# Patient Record
Sex: FEMALE | Race: BLACK OR AFRICAN AMERICAN | Employment: OTHER | URBAN - METROPOLITAN AREA
[De-identification: names, ages, dates, MRNs, and addresses within clinical notes are randomized per-mention and may not be internally consistent; named-entity substitution may affect disease eponyms.]

---

## 2019-11-02 ENCOUNTER — HOSPITAL ENCOUNTER (EMERGENCY)
Age: 44
Discharge: HOME | End: 2019-11-02
Attending: FAMILY MEDICINE
Payer: COMMERCIAL

## 2019-11-02 VITALS
HEART RATE: 83 BPM | DIASTOLIC BLOOD PRESSURE: 97 MMHG | WEIGHT: 142 LBS | HEIGHT: 65 IN | SYSTOLIC BLOOD PRESSURE: 154 MMHG | BODY MASS INDEX: 23.66 KG/M2 | RESPIRATION RATE: 18 BRPM | TEMPERATURE: 98.5 F | OXYGEN SATURATION: 100 %

## 2019-11-02 DIAGNOSIS — N39.0 ACUTE UTI: ICD-10-CM

## 2019-11-02 DIAGNOSIS — E86.0 DEHYDRATION: Primary | ICD-10-CM

## 2019-11-02 LAB
ALBUMIN SERPL-MCNC: 4.6 G/DL (ref 3.5–4.6)
ALP BLD-CCNC: 40 U/L (ref 40–130)
ALT SERPL-CCNC: 9 U/L (ref 0–33)
AMPHETAMINE SCREEN, URINE: ABNORMAL
ANION GAP SERPL CALCULATED.3IONS-SCNC: 17 MEQ/L (ref 9–15)
AST SERPL-CCNC: 18 U/L (ref 0–35)
BACTERIA: NEGATIVE /HPF
BARBITURATE SCREEN URINE: ABNORMAL
BASOPHILS ABSOLUTE: 0.1 K/UL (ref 0–0.2)
BASOPHILS RELATIVE PERCENT: 2.3 %
BENZODIAZEPINE SCREEN, URINE: ABNORMAL
BILIRUB SERPL-MCNC: 1.4 MG/DL (ref 0.2–0.7)
BILIRUBIN URINE: NEGATIVE
BLOOD, URINE: ABNORMAL
BUN BLDV-MCNC: 6 MG/DL (ref 6–20)
CALCIUM SERPL-MCNC: 8.9 MG/DL (ref 8.5–9.9)
CANNABINOID SCREEN URINE: POSITIVE
CHLORIDE BLD-SCNC: 100 MEQ/L (ref 95–107)
CLARITY: ABNORMAL
CO2: 25 MEQ/L (ref 20–31)
COCAINE METABOLITE SCREEN URINE: ABNORMAL
COLOR: ABNORMAL
CREAT SERPL-MCNC: 0.61 MG/DL (ref 0.5–0.9)
EOSINOPHILS ABSOLUTE: 0.1 K/UL (ref 0–0.7)
EOSINOPHILS RELATIVE PERCENT: 1.7 %
EPITHELIAL CELLS, UA: ABNORMAL /HPF (ref 0–5)
GFR AFRICAN AMERICAN: >60
GFR NON-AFRICAN AMERICAN: >60
GLOBULIN: 2.4 G/DL (ref 2.3–3.5)
GLUCOSE BLD-MCNC: 78 MG/DL (ref 70–99)
GLUCOSE URINE: NEGATIVE MG/DL
HBA1C MFR BLD: 4.8 % (ref 4.8–5.9)
HCT VFR BLD CALC: 41.7 % (ref 37–47)
HEMOGLOBIN: 14.1 G/DL (ref 12–16)
HYALINE CASTS: ABNORMAL /HPF (ref 0–5)
KETONES, URINE: ABNORMAL MG/DL
LEUKOCYTE ESTERASE, URINE: ABNORMAL
LYMPHOCYTES ABSOLUTE: 1.8 K/UL (ref 1–4.8)
LYMPHOCYTES RELATIVE PERCENT: 40.7 %
Lab: ABNORMAL
MCH RBC QN AUTO: 33.2 PG (ref 27–31.3)
MCHC RBC AUTO-ENTMCNC: 33.8 % (ref 33–37)
MCV RBC AUTO: 98.1 FL (ref 82–100)
METHADONE SCREEN, URINE: ABNORMAL
MONOCYTES ABSOLUTE: 0.3 K/UL (ref 0.2–0.8)
MONOCYTES RELATIVE PERCENT: 6.6 %
NEUTROPHILS ABSOLUTE: 2.2 K/UL (ref 1.4–6.5)
NEUTROPHILS RELATIVE PERCENT: 48.7 %
NITRITE, URINE: NEGATIVE
OPIATE SCREEN URINE: ABNORMAL
OXYCODONE URINE: ABNORMAL
PDW BLD-RTO: 14.3 % (ref 11.5–14.5)
PH UA: 6 (ref 5–9)
PHENCYCLIDINE SCREEN URINE: ABNORMAL
PLATELET # BLD: 102 K/UL (ref 130–400)
POTASSIUM SERPL-SCNC: 3.9 MEQ/L (ref 3.4–4.9)
PROPOXYPHENE SCREEN: ABNORMAL
PROTEIN UA: ABNORMAL MG/DL
RBC # BLD: 4.25 M/UL (ref 4.2–5.4)
RBC UA: ABNORMAL /HPF (ref 0–5)
SODIUM BLD-SCNC: 142 MEQ/L (ref 135–144)
SPECIFIC GRAVITY UA: 1.02 (ref 1–1.03)
TOTAL PROTEIN: 7 G/DL (ref 6.3–8)
URINE REFLEX TO CULTURE: YES
UROBILINOGEN, URINE: 1 E.U./DL
WBC # BLD: 4.5 K/UL (ref 4.8–10.8)
WBC UA: ABNORMAL /HPF (ref 0–5)

## 2019-11-02 PROCEDURE — 87086 URINE CULTURE/COLONY COUNT: CPT

## 2019-11-02 PROCEDURE — 85025 COMPLETE CBC W/AUTO DIFF WBC: CPT

## 2019-11-02 PROCEDURE — 81001 URINALYSIS AUTO W/SCOPE: CPT

## 2019-11-02 PROCEDURE — 2580000003 HC RX 258: Performed by: FAMILY MEDICINE

## 2019-11-02 PROCEDURE — 96360 HYDRATION IV INFUSION INIT: CPT

## 2019-11-02 PROCEDURE — 87186 SC STD MICRODIL/AGAR DIL: CPT

## 2019-11-02 PROCEDURE — 99283 EMERGENCY DEPT VISIT LOW MDM: CPT

## 2019-11-02 PROCEDURE — 87077 CULTURE AEROBIC IDENTIFY: CPT

## 2019-11-02 PROCEDURE — 80307 DRUG TEST PRSMV CHEM ANLYZR: CPT

## 2019-11-02 PROCEDURE — 96361 HYDRATE IV INFUSION ADD-ON: CPT

## 2019-11-02 PROCEDURE — 80053 COMPREHEN METABOLIC PANEL: CPT

## 2019-11-02 PROCEDURE — 83036 HEMOGLOBIN GLYCOSYLATED A1C: CPT

## 2019-11-02 PROCEDURE — 36415 COLL VENOUS BLD VENIPUNCTURE: CPT

## 2019-11-02 RX ORDER — CIPROFLOXACIN 500 MG/1
500 TABLET, FILM COATED ORAL ONCE
Status: DISCONTINUED | OUTPATIENT
Start: 2019-11-02 | End: 2019-11-02

## 2019-11-02 RX ORDER — CIPROFLOXACIN 500 MG/1
500 TABLET, FILM COATED ORAL 2 TIMES DAILY
Qty: 14 TABLET | Refills: 0 | Status: SHIPPED | OUTPATIENT
Start: 2019-11-02 | End: 2019-11-09

## 2019-11-02 RX ORDER — 0.9 % SODIUM CHLORIDE 0.9 %
1000 INTRAVENOUS SOLUTION INTRAVENOUS ONCE
Status: COMPLETED | OUTPATIENT
Start: 2019-11-02 | End: 2019-11-02

## 2019-11-02 RX ADMIN — SODIUM CHLORIDE 1000 ML: 9 INJECTION, SOLUTION INTRAVENOUS at 17:18

## 2019-11-02 ASSESSMENT — PAIN SCALES - GENERAL: PAINLEVEL_OUTOF10: 9

## 2019-11-03 ASSESSMENT — ENCOUNTER SYMPTOMS
RESPIRATORY NEGATIVE: 1
EYES NEGATIVE: 1
GASTROINTESTINAL NEGATIVE: 1
ALLERGIC/IMMUNOLOGIC NEGATIVE: 1

## 2019-11-04 LAB
ORGANISM: ABNORMAL
URINE CULTURE, ROUTINE: ABNORMAL

## 2020-07-13 ENCOUNTER — HOSPITAL ENCOUNTER (INPATIENT)
Age: 45
LOS: 4 days | Discharge: HOME | DRG: 751 | End: 2020-07-17
Attending: EMERGENCY MEDICINE | Admitting: PSYCHIATRY & NEUROLOGY
Payer: COMMERCIAL

## 2020-07-13 PROBLEM — F33.9 MAJOR DEPRESSION, RECURRENT (HCC): Status: ACTIVE | Noted: 2020-07-13

## 2020-07-13 LAB
ACETAMINOPHEN LEVEL: <5 UG/ML (ref 10–30)
ALBUMIN SERPL-MCNC: 4.5 G/DL (ref 3.5–4.6)
ALP BLD-CCNC: 39 U/L (ref 40–130)
ALT SERPL-CCNC: 24 U/L (ref 0–33)
AMPHETAMINE SCREEN, URINE: ABNORMAL
ANION GAP SERPL CALCULATED.3IONS-SCNC: 17 MEQ/L (ref 9–15)
AST SERPL-CCNC: 42 U/L (ref 0–35)
BACTERIA: NEGATIVE /HPF
BARBITURATE SCREEN URINE: ABNORMAL
BENZODIAZEPINE SCREEN, URINE: ABNORMAL
BILIRUB SERPL-MCNC: 1.1 MG/DL (ref 0.2–0.7)
BILIRUBIN URINE: NEGATIVE
BLOOD, URINE: ABNORMAL
BUN BLDV-MCNC: 5 MG/DL (ref 6–20)
CALCIUM SERPL-MCNC: 9.4 MG/DL (ref 8.5–9.9)
CANNABINOID SCREEN URINE: POSITIVE
CHLORIDE BLD-SCNC: 102 MEQ/L (ref 95–107)
CHOLESTEROL, TOTAL: 169 MG/DL (ref 0–199)
CLARITY: CLEAR
CO2: 19 MEQ/L (ref 20–31)
COCAINE METABOLITE SCREEN URINE: ABNORMAL
COLOR: YELLOW
CREAT SERPL-MCNC: 0.59 MG/DL (ref 0.5–0.9)
EPITHELIAL CELLS, UA: ABNORMAL /HPF (ref 0–5)
ETHANOL PERCENT: 0.03 G/DL
ETHANOL: 30 MG/DL (ref 0–0.08)
GFR AFRICAN AMERICAN: >60
GFR NON-AFRICAN AMERICAN: >60
GLOBULIN: 2.4 G/DL (ref 2.3–3.5)
GLUCOSE BLD-MCNC: 86 MG/DL (ref 70–99)
GLUCOSE URINE: NEGATIVE MG/DL
HCT VFR BLD CALC: 38.7 % (ref 37–47)
HDLC SERPL-MCNC: 87 MG/DL (ref 40–59)
HEMOGLOBIN: 13 G/DL (ref 12–16)
HYALINE CASTS: ABNORMAL /HPF (ref 0–5)
KETONES, URINE: 15 MG/DL
LDL CHOLESTEROL CALCULATED: 50 MG/DL (ref 0–129)
LEUKOCYTE ESTERASE, URINE: ABNORMAL
Lab: ABNORMAL
MCH RBC QN AUTO: 33.5 PG (ref 27–31.3)
MCHC RBC AUTO-ENTMCNC: 33.7 % (ref 33–37)
MCV RBC AUTO: 99.3 FL (ref 82–100)
METHADONE SCREEN, URINE: ABNORMAL
NITRITE, URINE: NEGATIVE
OPIATE SCREEN URINE: ABNORMAL
OXYCODONE URINE: ABNORMAL
PDW BLD-RTO: 14.1 % (ref 11.5–14.5)
PH UA: 5 (ref 5–9)
PHENCYCLIDINE SCREEN URINE: ABNORMAL
PLATELET # BLD: 143 K/UL (ref 130–400)
POTASSIUM SERPL-SCNC: 4 MEQ/L (ref 3.4–4.9)
PROPOXYPHENE SCREEN: ABNORMAL
PROTEIN UA: ABNORMAL MG/DL
RBC # BLD: 3.9 M/UL (ref 4.2–5.4)
RBC UA: ABNORMAL /HPF (ref 0–5)
SALICYLATE, SERUM: <0.3 MG/DL (ref 15–30)
SARS-COV-2, NAAT: NOT DETECTED
SODIUM BLD-SCNC: 138 MEQ/L (ref 135–144)
SPECIFIC GRAVITY UA: 1.02 (ref 1–1.03)
TOTAL CK: 94 U/L (ref 0–170)
TOTAL PROTEIN: 6.9 G/DL (ref 6.3–8)
TRIGL SERPL-MCNC: 158 MG/DL (ref 0–150)
TSH SERPL DL<=0.05 MIU/L-ACNC: 0.3 UIU/ML (ref 0.44–3.86)
UROBILINOGEN, URINE: 1 E.U./DL
WBC # BLD: 5.5 K/UL (ref 4.8–10.8)
WBC UA: ABNORMAL /HPF (ref 0–5)

## 2020-07-13 PROCEDURE — 85027 COMPLETE CBC AUTOMATED: CPT

## 2020-07-13 PROCEDURE — 80061 LIPID PANEL: CPT

## 2020-07-13 PROCEDURE — U0002 COVID-19 LAB TEST NON-CDC: HCPCS

## 2020-07-13 PROCEDURE — 1240000000 HC EMOTIONAL WELLNESS R&B

## 2020-07-13 PROCEDURE — 82550 ASSAY OF CK (CPK): CPT

## 2020-07-13 PROCEDURE — 80053 COMPREHEN METABOLIC PANEL: CPT

## 2020-07-13 PROCEDURE — 6370000000 HC RX 637 (ALT 250 FOR IP): Performed by: PSYCHIATRY & NEUROLOGY

## 2020-07-13 PROCEDURE — G0480 DRUG TEST DEF 1-7 CLASSES: HCPCS

## 2020-07-13 PROCEDURE — 6370000000 HC RX 637 (ALT 250 FOR IP): Performed by: EMERGENCY MEDICINE

## 2020-07-13 PROCEDURE — 99285 EMERGENCY DEPT VISIT HI MDM: CPT

## 2020-07-13 PROCEDURE — 36415 COLL VENOUS BLD VENIPUNCTURE: CPT

## 2020-07-13 PROCEDURE — 80307 DRUG TEST PRSMV CHEM ANLYZR: CPT

## 2020-07-13 PROCEDURE — 81001 URINALYSIS AUTO W/SCOPE: CPT

## 2020-07-13 PROCEDURE — 84443 ASSAY THYROID STIM HORMONE: CPT

## 2020-07-13 RX ORDER — POLYETHYLENE GLYCOL 3350 17 G/17G
17 POWDER, FOR SOLUTION ORAL DAILY PRN
Status: DISCONTINUED | OUTPATIENT
Start: 2020-07-13 | End: 2020-07-17 | Stop reason: HOSPADM

## 2020-07-13 RX ORDER — BENZTROPINE MESYLATE 1 MG/ML
2 INJECTION INTRAMUSCULAR; INTRAVENOUS 2 TIMES DAILY PRN
Status: DISCONTINUED | OUTPATIENT
Start: 2020-07-13 | End: 2020-07-17 | Stop reason: HOSPADM

## 2020-07-13 RX ORDER — ACETAMINOPHEN 325 MG/1
650 TABLET ORAL EVERY 4 HOURS PRN
Status: DISCONTINUED | OUTPATIENT
Start: 2020-07-13 | End: 2020-07-17 | Stop reason: HOSPADM

## 2020-07-13 RX ORDER — ESCITALOPRAM OXALATE 20 MG/1
20 TABLET ORAL DAILY
Status: ON HOLD | COMMUNITY
Start: 2020-05-25 | End: 2020-07-17 | Stop reason: HOSPADM

## 2020-07-13 RX ORDER — HYDROXYZINE PAMOATE 50 MG/1
50 CAPSULE ORAL EVERY 6 HOURS PRN
Status: DISCONTINUED | OUTPATIENT
Start: 2020-07-13 | End: 2020-07-17 | Stop reason: HOSPADM

## 2020-07-13 RX ORDER — TRAZODONE HYDROCHLORIDE 50 MG/1
50 TABLET ORAL NIGHTLY PRN
Status: DISCONTINUED | OUTPATIENT
Start: 2020-07-13 | End: 2020-07-14

## 2020-07-13 RX ORDER — LORAZEPAM 1 MG/1
1 TABLET ORAL ONCE
Status: COMPLETED | OUTPATIENT
Start: 2020-07-13 | End: 2020-07-13

## 2020-07-13 RX ORDER — LISINOPRIL 40 MG/1
40 TABLET ORAL DAILY
COMMUNITY
Start: 2020-05-25 | End: 2020-08-22

## 2020-07-13 RX ORDER — HYDROXYZINE HYDROCHLORIDE 50 MG/ML
50 INJECTION, SOLUTION INTRAMUSCULAR EVERY 6 HOURS PRN
Status: DISCONTINUED | OUTPATIENT
Start: 2020-07-13 | End: 2020-07-17 | Stop reason: HOSPADM

## 2020-07-13 RX ADMIN — LORAZEPAM 1 MG: 1 TABLET ORAL at 13:52

## 2020-07-13 RX ADMIN — TRAZODONE HYDROCHLORIDE 50 MG: 50 TABLET ORAL at 22:56

## 2020-07-13 RX ADMIN — HYDROXYZINE PAMOATE 50 MG: 50 CAPSULE ORAL at 22:56

## 2020-07-13 ASSESSMENT — SLEEP AND FATIGUE QUESTIONNAIRES
DO YOU USE A SLEEP AID: YES
RESTFUL SLEEP: NO
SLEEP PATTERN: DIFFICULTY FALLING ASLEEP;DISTURBED/INTERRUPTED SLEEP;EARLY AWAKENING;NIGHTMARES/TERRORS
DO YOU HAVE DIFFICULTY SLEEPING: YES
DIFFICULTY ARISING: NO
DIFFICULTY STAYING ASLEEP: YES
AVERAGE NUMBER OF SLEEP HOURS: 4
DIFFICULTY FALLING ASLEEP: YES

## 2020-07-13 ASSESSMENT — PATIENT HEALTH QUESTIONNAIRE - PHQ9
SUM OF ALL RESPONSES TO PHQ QUESTIONS 1-9: 23
SUM OF ALL RESPONSES TO PHQ QUESTIONS 1-9: 23
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
## 2020-07-13 NOTE — ED NOTES
Pt to [facility]. Security gathering belongings.      Carolee Lawrence RN  07/13/20 8922

## 2020-07-13 NOTE — ED NOTES
Pt changed in to psych safe clothing pt seems to be calming down and answering questions pt reports  That she has bad thoughts all the time \" sometimes I can fight them off\" pt states that she feels safe with this writer pt in room given lunch tray at this time pt does not want to eat given water and will remain a 1:1 psych RN made a call to family as listed in the note      Edgar Michael RN  07/13/20 6920

## 2020-07-14 PROCEDURE — 6370000000 HC RX 637 (ALT 250 FOR IP): Performed by: PSYCHIATRY & NEUROLOGY

## 2020-07-14 PROCEDURE — 99223 1ST HOSP IP/OBS HIGH 75: CPT | Performed by: PSYCHIATRY & NEUROLOGY

## 2020-07-14 PROCEDURE — 1240000000 HC EMOTIONAL WELLNESS R&B

## 2020-07-14 RX ORDER — THIAMINE MONONITRATE (VIT B1) 100 MG
100 TABLET ORAL DAILY
Status: ON HOLD | COMMUNITY
End: 2020-07-17 | Stop reason: HOSPADM

## 2020-07-14 RX ORDER — PRAZOSIN HYDROCHLORIDE 1 MG/1
2 CAPSULE ORAL NIGHTLY
Status: DISCONTINUED | OUTPATIENT
Start: 2020-07-14 | End: 2020-07-16

## 2020-07-14 RX ORDER — TRAZODONE HYDROCHLORIDE 50 MG/1
50 TABLET ORAL NIGHTLY
COMMUNITY

## 2020-07-14 RX ORDER — TRAZODONE HYDROCHLORIDE 50 MG/1
50 TABLET ORAL NIGHTLY
Status: DISCONTINUED | OUTPATIENT
Start: 2020-07-14 | End: 2020-07-17 | Stop reason: HOSPADM

## 2020-07-14 RX ORDER — AMLODIPINE BESYLATE 10 MG/1
10 TABLET ORAL DAILY
COMMUNITY

## 2020-07-14 RX ORDER — MAGNESIUM OXIDE 400 MG/1
40 TABLET ORAL
COMMUNITY

## 2020-07-14 RX ORDER — VENLAFAXINE HYDROCHLORIDE 37.5 MG/1
37.5 CAPSULE, EXTENDED RELEASE ORAL
Status: DISCONTINUED | OUTPATIENT
Start: 2020-07-14 | End: 2020-07-17 | Stop reason: HOSPADM

## 2020-07-14 RX ADMIN — HYDROXYZINE PAMOATE 50 MG: 50 CAPSULE ORAL at 19:54

## 2020-07-14 RX ADMIN — PRAZOSIN HYDROCHLORIDE 2 MG: 1 CAPSULE ORAL at 22:05

## 2020-07-14 RX ADMIN — VENLAFAXINE HYDROCHLORIDE 37.5 MG: 37.5 CAPSULE, EXTENDED RELEASE ORAL at 10:58

## 2020-07-14 RX ADMIN — TRAZODONE HYDROCHLORIDE 50 MG: 50 TABLET ORAL at 22:05

## 2020-07-14 ASSESSMENT — LIFESTYLE VARIABLES: HISTORY_ALCOHOL_USE: NO

## 2020-07-14 NOTE — GROUP NOTE
Group Therapy Note    Date: 7/14/2020    Group Start Time: 9767  Group End Time: 1900  Group Topic: Recreational    [facility]W [facility]    Vickie Terrazas        Group Therapy Note    Attendees: 11/17       Patient's Goal:  To actively participate in the 1900 Activity Group and interact with other group members. Notes:  Patient actively participated in the 1900 Activity Group and appropriately interacted with the other group participants.     Status After Intervention:  Improved    Participation Level: Interactive    Participation Quality: Appropriate and Attentive      Speech:  normal      Thought Process/Content: Logical      Affective Functioning: Flat      Mood: euthymic      Level of consciousness:  Alert and Attentive      Response to Learning: Able to verbalize current knowledge/experience      Endings: None Reported    Modes of Intervention: Activity      Discipline Responsible: [employer] [address] [place]      Signature:  Vickie Terrazas

## 2020-07-14 NOTE — PROGRESS NOTES
Patient arrived on unit, skin check completed by writer and Yvrose Taylor, belongings secured and checked for contraband, oriented to room and unit, meal provided

## 2020-07-14 NOTE — ED NOTES
Patient updated on plan of care and disposition from on call psychiatrist. She is agreeable with admission. Minh Dates  John Mccloud RN  07/13/20 3952

## 2020-07-14 NOTE — BH NOTE
Patient is sad and tearful. She describes increasing depression over months accompanied by increased irritability. She denies any intent to harm herself or others today. She sobs about her desire to go home and be with family. She describes feeling paranoid. She sometimes senses that someone is next to her and there is no one there. She describes dealing with depression all her life, with worsening symptoms over the last week. No evidence of hallucinatory behaviors.

## 2020-07-14 NOTE — PROGRESS NOTES
Pt. attended the 0900 community meeting. Electronically signed by Juan Ibarra, [address] on 7/14/2020 at 9:41 AM
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
## 2020-07-14 NOTE — GROUP NOTE
Group Therapy Note    Date: 7/14/2020    Group Start Time: 1105  Group End Time: 1200  Group Topic: Psychotherapy    [facility] 3W HANK Aldridge, [facility]        Group Therapy Note    Attendees: 6         Patient's Goal:  To feel better    Notes:  Patients meds were just changed    Status After Intervention:  Improved    Participation Level: Interactive    Participation Quality: Appropriate      Speech:  normal      Thought Process/Content: Logical      Affective Functioning: Congruent      Mood: anxious      Level of consciousness:  Alert      Response to Learning: Progressing to goal      Endings: None Reported    Modes of Intervention: Support      Discipline Responsible: /Counselor      Signature:  Bella Aldridge, [facility]
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
## 2020-07-14 NOTE — PROGRESS NOTES
Patient was laying in bed in her room. She was awake and alert. Patient had a sad affect, was worrisome, anxious and tearful. Patient stated she is depressed, she is stressed with life in general, she is always anxious and has panic attacks. She has poor sleep and has nightmares. She has poor appetite. She feels hopeless, helpless and is having a hard time during Covid-19. She denies being suicidal.  She hears voices telling her she is ugly and to kill herself. She feels someone's presence by her but no one is there. She has racing and paranoid thoughts at times. She drinks alcohol a couple times a week and smokes marijuana every so often. She has a supportive family. She is hopeful about her recovery. She enjoys going to [employer] every week and playing with her nephew.  Electronically signed by [place] [address] on 7/14/2020 at 8:36 AM

## 2020-07-14 NOTE — CARE COORDINATION
FAMILY COLLATERAL NOTE    Family/Support Name: Ander Diaz #: [phone]  Relationship to Pt: mother      Placed call to above. Response:   called number provided by patient. A voicemail was left requesting a return phone call.         ANGEL Jenkins

## 2020-07-14 NOTE — PROGRESS NOTES
Patient declined to attend the [facility],[address] despite staff encouragement.  Electronically signed by Hansel Luke on 7/14/2020 at 4:57 PM

## 2020-07-14 NOTE — CARE COORDINATION
Brief Intervention and Referral to Treatment Summary    Patient was provided PHQ-9, AUDIT and DAST Screening:      PHQ-9 Score: 23  AUDIT Score: 0   DAST Score: 0     Patients substance use is considered     Low Risk/Healthy x  Moderate Risk  Harmful  Dependent    Patients depression is considered:     Minimal  Mild   Moderate  Moderately Severe  Severe x    Brief Education Was Provided    Patient was receptive x  Patient was not receptive      Brief Intervention Is Provided (Only for AUDIT or DAST)     Patient reports readiness to decrease and/or stop use and a plan was discussed x   Patient denies readiness to decrease and/or stop use and a plan was not discussed      Recommendations/Referrals for Brief and/or Specialized Treatment Provided to Patient   Patient drinks and smokes weed socially. She does not see it as a problem. As a result, no brief education or intervention was completed.     Electronically signed by ANGEL Burdick on 7/14/2020 at 1:02 PM

## 2020-07-14 NOTE — PROGRESS NOTES
Comprehensive Nutrition Assessment    Type and Reason for Visit:  Initial, Positive Nutrition Screen(poor appetite/wt loss)    Nutrition Recommendations/Plan: Continue General diet    Nutrition Assessment:  Pt admitted to behavioral health unit, at nutrition risk due to report of poor oral intake with wt loss pta. Anticipate appetite/po intake will improve with inpatient behavioral health treatment. Will monitor adequacy of po diet    Malnutrition Assessment:  Malnutrition Status:  Insufficient data    Context:  Social/Environmental Circumstances       Estimated Daily Nutrient Needs:  Energy (kcal):   ; Weight Used for Energy Requirements:        Protein (g):   ; Weight Used for Protein Requirements:           Fluid (ml/day):   ; Weight Used for Fluid Requirements:         Nutrition Related Findings:         Wounds:  None       Current Nutrition Therapies:  General Diet    Anthropometric Measures:  · Height:  5'4\"  · Admission Body Weight: 134 lb (60.8 kg)    · Usual Body Weight: 142 lb (64.4 kg)(stated 11/2/19)     · Ideal Body Weight:  ;  120 lb   · BMI:  22.6  · Adjusted Body Weight:  ; No Adjustment   · BMI Categories: Normal Weight (BMI 22.0 to 24.9) age over 72       Nutrition Diagnosis: Inadequate oral intake related to psychological probleme/life stress as evidenced by reported poor po intake pta    Nutrition Interventions:   Food and/or Nutrient Delivery:  Continue Current Diet  Nutrition Education/Counseling:  Education not indicated   Coordination of Nutrition Care:  No recommendation at this time    Goals:  po intake > 75% of meals       Nutrition Monitoring and Evaluation:    Food/Nutrient Intake Outcomes:  Food and Nutrient Intake  Physical Signs/Symptoms Outcomes:  Weight     Discharge Planning:     Too soon to determine     Electronically signed by Ender Shore RD, LD on 7/14/20 at 3:18 PM EDT

## 2020-07-14 NOTE — GROUP NOTE
Group Therapy Note    Date: 7/14/2020    Group Start Time: 1430  Group End Time: 1510  Group Topic: Cognitive Skills    [facility]    ANGEL Burdick        Group Therapy Note    Attendees: 12         Patient's Goal:  To participate in mood management group. Notes:  Patient learned about the cycle of anger. Status After Intervention:  Unchanged    Participation Level: Active Listener    Participation Quality: Attentive      Speech:  normal      Thought Process/Content: Perseverating      Affective Functioning: Flat      Mood: angry      Level of consciousness:  Attentive      Response to Learning: Able to verbalize current knowledge/experience      Endings: None Reported    Modes of Intervention: Education      Discipline Responsible: /Counselor      Signature:   ANGEL Burdick

## 2020-07-14 NOTE — GROUP NOTE
Group Therapy Note    Date: 7/14/2020    Group Start Time: 1000  Group End Time: 1050  Group Topic: Psychoeducation    [facility]    Whitney Jimenez        Group Therapy Note    Attendees: 10         Patient's Goal:  \"To work on my depression and anxiety\"    Notes:  Patient was quiet, stay to herself in the corner and left the session early to see the Doctor. Patient work minimal on her task.      Status After Intervention:  Unchanged    Participation Level: Minimal    Participation Quality: Appropriate      Speech:  quiet      Thought Process/Content: Linear      Affective Functioning: Flat      Mood: depressed      Level of consciousness:  Alert      Response to Learning: Progressing to goal      Endings: None Reported    Modes of Intervention: Education, Socialization and Activity      Discipline Responsible: Psychoeducational Specialist      Signature:  Whitney Jimenez
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
## 2020-07-14 NOTE — PLAN OF CARE
Nutrition Problem #1: Inadequate oral intake  Intervention: Food and/or Nutrient Delivery: Continue Current Diet  Nutritional Goals: po intake > 75% of meals

## 2020-07-14 NOTE — ED NOTES
Consult with Dr Stephanie Rojo, patient to be admitted to [address] [place] [address] with Major depression with the following orders. Hilario Rosado RN  07/13/20 [id number]

## 2020-07-15 PROCEDURE — 6370000000 HC RX 637 (ALT 250 FOR IP): Performed by: PSYCHIATRY & NEUROLOGY

## 2020-07-15 PROCEDURE — 1240000000 HC EMOTIONAL WELLNESS R&B

## 2020-07-15 PROCEDURE — 99232 SBSQ HOSP IP/OBS MODERATE 35: CPT | Performed by: PSYCHIATRY & NEUROLOGY

## 2020-07-15 RX ADMIN — HYDROXYZINE PAMOATE 50 MG: 50 CAPSULE ORAL at 13:52

## 2020-07-15 RX ADMIN — TRAZODONE HYDROCHLORIDE 50 MG: 50 TABLET ORAL at 21:53

## 2020-07-15 RX ADMIN — HYDROXYZINE PAMOATE 50 MG: 50 CAPSULE ORAL at 20:15

## 2020-07-15 RX ADMIN — PRAZOSIN HYDROCHLORIDE 2 MG: 1 CAPSULE ORAL at 21:53

## 2020-07-15 RX ADMIN — VENLAFAXINE HYDROCHLORIDE 37.5 MG: 37.5 CAPSULE, EXTENDED RELEASE ORAL at 08:41

## 2020-07-15 NOTE — PROGRESS NOTES
Patient did not attend Activity Group despite staff encouragement.  Electronically signed by Jolanta Snyder on 7/15/2020 at 7:00 PM

## 2020-07-15 NOTE — FLOWSHEET NOTE
Patient is alert and orient x4, lungs clear, no adventitious heart sounds, abdomen soft, non-tender, rates her anxiety ay a 3-4/10 and depression maybe a 2/10. Patient denies SI/HI/AVH. Out and social with peers, went to group also.

## 2020-07-15 NOTE — PROGRESS NOTES
[facility]. FOLLOW-UP NOTE       7/15/2020     Patient was seen and examined in person, Chart reviewed   Patient's case discussed with staff/team    Chief Complaint: depression SI    Interim History:     Pt slept better until this AM when she woke up with nightmare  Mood is slightly better  Less depressed and has been talking with her family  No further panic attacks  Denies any active SI  No agitation    Appetite:   [x] Normal/Unchanged  [] Increased  [] Decreased      Sleep:       [] Normal/Unchanged  [x] Fair       [] Poor              Energy:    [x] Normal/Unchanged  [] Increased  [] Decreased        SI [] Present  [x] Absent    HI  []Present  [x] Absent     Aggression:  [] yes  [x] no    Patient is [x] able  [] unable to CONTRACT FOR SAFETY     PAST MEDICAL/PSYCHIATRIC HISTORY:   Past Medical History:   Diagnosis Date    ADHD (attention deficit hyperactivity disorder) 1980    Allergic rhinitis     seasonal allergies    Anxiety 2000    Asthma 2005    Depression 2000    Hypertension 2014       FAMILY/SOCIAL HISTORY:  Family History   Problem Relation Age of Onset    Asthma Mother     Kidney Disease Father     Other Brother         over active hormones     Social History     Socioeconomic History    Marital status:      Spouse name: Not on file    Number of children: Not on file    Years of education: Not on file    Highest education level: Not on file   Occupational History    Not on file   Social Needs    Financial resource strain: Not on file    Food insecurity     Worry: Not on file     Inability: Not on file    Transportation needs     Medical: Not on file     Non-medical: Not on file   Tobacco Use    Smoking status: Current Some Day Smoker    Smokeless tobacco: Never Used   Substance and Sexual Activity    Alcohol use:  Yes     Alcohol/week: 1.0 standard drinks     Types: 1 Glasses of wine per week    Drug use: Yes     Types: Marijuana    patient and treatment team.    PSYCHOTHERAPY/COUNSELING:  [x] Therapeutic interview  [x] Supportive  [] CBT  [] Ongoing  [] Other    [x] Patient continues to need, on a daily basis, active treatment furnished directly by or requiring the supervision of inpatient psychiatric personnel      Anticipated Length of stay: friday            Electronically signed by Li Myles MD on 7/15/2020 at 11:26 AM

## 2020-07-15 NOTE — PROGRESS NOTES
Pt out on unit, social with peers. Pt voiced concerns after discharge, ability to function, nightmares. Pt reports showering today. Pt presents with clean and well kept appearance. Pt reports good appetite. Pt reports good sleep, medication effective. Pt rates anxiety, 1 /10. Pt rates depression, 1 / 10. Pt denies attending groups. Pt states, \" large group of people are overwhelming\". Pt denies SI, HI and A/V hallucinations. No voiced delusions at this time. Pt alert and oriented x 4. Will continue to monitor.

## 2020-07-15 NOTE — PROGRESS NOTES
Group Therapy Note    Date:7/15/2020  Start Time: 8389  End Time:  4028    Number of Participants:9    Type of Group: Cognitive Skills    Patient's Goal:  To participate in mood management group. Notes: Patient declined to attend psychoeducation group at 1420despite encouragement by staff.      Discipline Responsible: /Counselor    ANGEL Prieto

## 2020-07-15 NOTE — GROUP NOTE
Group Therapy Note    Date: 7/15/2020    Group Start Time: 1000  Group End Time: 1050  Group Topic: Psychoeducation    [facility]    Sammi Romero        Group Therapy Note    Attendees: 8         Patient's Goal:  \"To go home\"    Notes:  Patient was more talkative but at times becomes quiet, tearful and then appears sad. Patient stated she is hard on herself and feels like she is doing something wrong. She did feel better working on her project in group.     Status After Intervention:  Unchanged    Participation Level: Adequate    Participation Quality: Appropriate      Speech:  More talkative      Thought Process/Content: Linear      Affective Functioning: Flat      Mood: calm      Level of consciousness:  Alert      Response to Learning: Progressing to goal      Endings: None Reported    Modes of Intervention: Education, Socialization and Activity      Discipline Responsible: Psychoeducational Specialist      Signature:  Evelyn Mims

## 2020-07-15 NOTE — PROGRESS NOTES
Pt. attended the 0900 community meeting. Electronically signed by Lenin De Anda, [address] on 7/15/2020 at 9:46 AM

## 2020-07-15 NOTE — GROUP NOTE
Group Therapy Note    Date: 7/14/2020    Group Start Time: 2110  Group End Time: 2145  Group Topic: Wrap-Up    [facility]    Jarocho French        Group Therapy Note    Attendees: 10/18       Patient's Goal:  Patient's goal for today was to \"get right\" on her medications.     Notes:  Patient reports meeting their goal for today. Patient also participated in Progressive Muscle Relaxation following Wrap Up Group. Status After Intervention:  Improved    Participation Level:  Active Listener    Participation Quality: Appropriate, Attentive and Sharing      Speech:  normal      Thought Process/Content: Logical      Affective Functioning: Congruent      Mood: euthymic      Level of consciousness:  Alert and Attentive      Response to Learning: Able to verbalize current knowledge/experience and Progressing to goal      Endings: None Reported    Modes of Intervention: Support      Discipline Responsible: [address], [facility]      Signature:  Jarocho French

## 2020-07-16 PROBLEM — F43.10 PTSD (POST-TRAUMATIC STRESS DISORDER): Status: ACTIVE | Noted: 2020-07-16

## 2020-07-16 PROBLEM — F33.2 SEVERE EPISODE OF RECURRENT MAJOR DEPRESSIVE DISORDER, WITHOUT PSYCHOTIC FEATURES (HCC): Status: ACTIVE | Noted: 2020-07-13

## 2020-07-16 PROCEDURE — 6370000000 HC RX 637 (ALT 250 FOR IP): Performed by: PSYCHIATRY & NEUROLOGY

## 2020-07-16 PROCEDURE — 1240000000 HC EMOTIONAL WELLNESS R&B

## 2020-07-16 PROCEDURE — 99232 SBSQ HOSP IP/OBS MODERATE 35: CPT | Performed by: PSYCHIATRY & NEUROLOGY

## 2020-07-16 PROCEDURE — 90833 PSYTX W PT W E/M 30 MIN: CPT | Performed by: PSYCHIATRY & NEUROLOGY

## 2020-07-16 RX ORDER — PRAZOSIN HYDROCHLORIDE 1 MG/1
3 CAPSULE ORAL NIGHTLY
Status: DISCONTINUED | OUTPATIENT
Start: 2020-07-16 | End: 2020-07-17 | Stop reason: HOSPADM

## 2020-07-16 RX ADMIN — PRAZOSIN HYDROCHLORIDE 3 MG: 1 CAPSULE ORAL at 22:03

## 2020-07-16 RX ADMIN — HYDROXYZINE PAMOATE 50 MG: 50 CAPSULE ORAL at 13:25

## 2020-07-16 RX ADMIN — TRAZODONE HYDROCHLORIDE 50 MG: 50 TABLET ORAL at 22:03

## 2020-07-16 RX ADMIN — HYDROXYZINE PAMOATE 50 MG: 50 CAPSULE ORAL at 19:56

## 2020-07-16 RX ADMIN — VENLAFAXINE HYDROCHLORIDE 37.5 MG: 37.5 CAPSULE, EXTENDED RELEASE ORAL at 09:00

## 2020-07-16 RX ADMIN — ACETAMINOPHEN 650 MG: 325 TABLET, FILM COATED ORAL at 17:38

## 2020-07-16 ASSESSMENT — PAIN SCALES - GENERAL
PAINLEVEL_OUTOF10: 6
PAINLEVEL_OUTOF10: 2

## 2020-07-16 NOTE — PROGRESS NOTES
Patient did not attend Activity Group despite staff encouragement.  Electronically signed by Ramon Tatum on 7/16/2020 at 6:55 PM

## 2020-07-16 NOTE — PROGRESS NOTES
[facility]. FOLLOW-UP NOTE       7/16/2020     Patient was seen and examined in person, Chart reviewed   Patient's case discussed with staff/team    Chief Complaint: depression SI    Interim History:     Pt is feeling better  Less depressed  No active SI today  Has been interacting better  Attending groups      Appetite:   [x] Normal/Unchanged  [] Increased  [] Decreased      Sleep:       [] Normal/Unchanged  [x] Fair       [] Poor              Energy:    [x] Normal/Unchanged  [] Increased  [] Decreased        SI [] Present  [x] Absent    HI  []Present  [x] Absent     Aggression:  [] yes  [x] no    Patient is [x] able  [] unable to CONTRACT FOR SAFETY     PAST MEDICAL/PSYCHIATRIC HISTORY:   Past Medical History:   Diagnosis Date    ADHD (attention deficit hyperactivity disorder) 1980    Allergic rhinitis     seasonal allergies    Anxiety 2000    Asthma 2005    Depression 2000    Hypertension 2014       FAMILY/SOCIAL HISTORY:  Family History   Problem Relation Age of Onset    Asthma Mother     Kidney Disease Father     Other Brother         over active hormones     Social History     Socioeconomic History    Marital status:      Spouse name: Not on file    Number of children: Not on file    Years of education: Not on file    Highest education level: Not on file   Occupational History    Not on file   Social Needs    Financial resource strain: Not on file    Food insecurity     Worry: Not on file     Inability: Not on file    Transportation needs     Medical: Not on file     Non-medical: Not on file   Tobacco Use    Smoking status: Current Some Day Smoker    Smokeless tobacco: Never Used   Substance and Sexual Activity    Alcohol use:  Yes     Alcohol/week: 1.0 standard drinks     Types: 1 Glasses of wine per week    Drug use: Yes     Types: Marijuana    Sexual activity: Yes     Partners: Male   Lifestyle    Physical activity     Days per week: Not on file     Minutes per session: Not on file    Stress: Not on file   Relationships    Social connections     Talks on phone: Not on file     Gets together: Not on file     Attends Lutheran service: Not on file     Active member of club or organization: Not on file     Attends meetings of clubs or organizations: Not on file     Relationship status: Not on file    Intimate partner violence     Fear of current or ex partner: Not on file     Emotionally abused: Not on file     Physically abused: Not on file     Forced sexual activity: Not on file   Other Topics Concern    Not on file   Social History Narrative    Not on file           ROS:  [x] All negative/unchanged except if checked.  Explain positive(checked items) below:  [] Constitutional  [] Eyes  [] Ear/Nose/Mouth/Throat  [] Respiratory  [] CV  [] GI  []   [] Musculoskeletal  [] Skin/Breast  [] Neurological  [] Endocrine  [] Heme/Lymph  [] Allergic/Immunologic    Explanation:     MEDICATIONS:    Current Facility-Administered Medications:     venlafaxine (EFFEXOR XR) extended release capsule 37.5 mg, 37.5 mg, Oral, Daily with breakfast, Neeta Crum MD, 37.5 mg at 07/15/20 0841    prazosin (MINIPRESS) capsule 2 mg, 2 mg, Oral, Nightly, Neeta Crum MD, 2 mg at 07/15/20 2153    traZODone (DESYREL) tablet 50 mg, 50 mg, Oral, Nightly, Neeta Crum MD, 50 mg at 07/15/20 2153    acetaminophen (TYLENOL) tablet 650 mg, 650 mg, Oral, Q4H PRN, Cindy Chávez MD    polyethylene glycol [facility]) packet 17 g, 17 g, Oral, Daily PRN, Cindy Chávez MD    benztropine mesylate (COGENTIN) injection 2 mg, 2 mg, Intramuscular, BID PRN, Cindy Chávez MD    hydrOXYzine (VISTARIL) injection 50 mg, 50 mg, Intramuscular, Q6H PRN **OR** hydrOXYzine (VISTARIL) capsule 50 mg, 50 mg, Oral, Q6H PRN, Cindy Chávez MD, 50 mg at 07/15/20 2015      Examination:  /81   Pulse 89   Temp 98 °F (36.7 °C) (Oral)   Resp 19   Wt 134 lb (60.8 kg)   SpO2 100% BMI 22.65 kg/m²   Gait - steady  Medication side effects(SE): no    Mental Status Examination:    Level of consciousness:  within normal limits   Appearance:  fair grooming and fair hygiene  Behavior/Motor: less psychomotor retardation  Attitude toward examiner:  cooperative  Speech:  slow   Mood: less depressed  Affect:  mood congruent  Thought processes:  linear   Thought content:  Suicidal Ideation:  denies  Delusions:  no evidence of delusions  Perceptual Disturbance:  denies any perceptual disturbance  Cognition:  oriented to person, place, and time   Concentration intact  Insight fair   Judgement fair     ASSESSMENT:   Patient symptoms are:  [] Well controlled  [x] Improving  [] Worsening  [] No change      Diagnosis:   Principal Problem:    Severe episode of recurrent major depressive disorder, without psychotic features ([facility]  Active Problems:    PTSD (post-traumatic stress disorder)  Resolved Problems:    * No resolved hospital problems. *      LABS:    Recent Labs     07/13/20  1616   WBC 5.5   HGB 13.0        Recent Labs     07/13/20  1616      K 4.0      CO2 19*   BUN 5*   CREATININE 0.59   GLUCOSE 86     Recent Labs     07/13/20  1616   BILITOT 1.1*   ALKPHOS 39*   AST 42*   ALT 24     Lab Results   Component Value Date    LABAMPH Neg 07/13/2020    BARBSCNU Neg 07/13/2020    LABBENZ Neg 07/13/2020    LABMETH Neg 07/13/2020    OPIATESCREENURINE Neg 07/13/2020    PHENCYCLIDINESCREENURINE Neg 07/13/2020    ETOH 30 07/13/2020     Lab Results   Component Value Date    TSH 0.298 07/13/2020     No results found for: LITHIUM  No results found for: VALPROATE, CBMZ      Treatment Plan:  Reviewed current Medications with the patient. Medication as ordered  Risks, benefits, side effects, drug-to-drug interactions and alternatives to treatment were discussed. Collateral information: pending  CD evaluation  Encourage patient to attend group and other milieu activities.   Discharge planning discussed with the patient and treatment team.    PSYCHOTHERAPY/COUNSELING:  [x] Therapeutic interview  [x] Supportive  [] CBT  [] Ongoing  [] Other  Patient was seen 1:1 for 20 minutes, other than E&M time spent, focusing on      - coping skills techniques     - Anxiety management techniques discussed including deep breathing exercise and PMR     - discussing patients strength and weakness      - Focusing on negative cognition and maladaptive thoughts, which is feeding and maintaining the depression symptoms        [x] Patient continues to need, on a daily basis, active treatment furnished directly by or requiring the supervision of inpatient psychiatric personnel      Anticipated Length of stay: friday            Electronically signed by Dereck Garrido MD on 7/16/2020 at 11:25 AM

## 2020-07-16 NOTE — PROGRESS NOTES
Last night during CIT while patient in group room for safety, pt stated \"That gurjit almost got out. We could all get out if we all go at once. They can't stop us all. \" Pt irritable in evening. Pt yells out in sleep, yelling \"stop talking bitch! \" at one point.  Electronically signed by Cheyanne Bejarano RN on 7/16/20 at 6:36 AM EDT

## 2020-07-16 NOTE — GROUP NOTE
Group Therapy Note    Date: 7/16/2020    Group Start Time: 1000  Group End Time: 1050  Group Topic: Psychoeducation    [facility]    Sammi Romero        Group Therapy Note    Attendees: 7         Patient's Goal:  \"To be more relax\"    Notes:  Patient was more animated, more relax and work well on her project in group. Status After Intervention:  Improved    Participation Level:  Active Listener    Participation Quality: Appropriate      Speech:  normal      Thought Process/Content: Linear      Affective Functioning: Congruent      Mood: relax      Level of consciousness:  Alert and Attentive      Response to Learning: Able to verbalize current knowledge/experience      Endings: None Reported    Modes of Intervention: Education, Socialization and Activity      Discipline Responsible: Psychoeducational Specialist      Signature:  Dena Quiñones

## 2020-07-16 NOTE — CARE COORDINATION
Patient did not attend group despite staff encouragement.   Electronically signed by Geo Hoskins on 7/16/2020 at 2:36 PM

## 2020-07-16 NOTE — PROGRESS NOTES
Pt. attended the 0900 community meeting. Electronically signed by Dimitri Brennan [address] on 7/16/2020 at 10:46 AM

## 2020-07-16 NOTE — FLOWSHEET NOTE
Pt has been visible on unit. Bright and social with peers. Attending groups. Says her mood is good today. Admits to anxiety 3/10 today and overall improved. Denies SI/HI/AVH and depression. Experiences nightmares frequently and had broken up sleep last night due to this. Eating well, showering and attending groups.

## 2020-07-17 VITALS
WEIGHT: 134 LBS | SYSTOLIC BLOOD PRESSURE: 155 MMHG | BODY MASS INDEX: 22.65 KG/M2 | TEMPERATURE: 98 F | RESPIRATION RATE: 20 BRPM | DIASTOLIC BLOOD PRESSURE: 82 MMHG | HEART RATE: 78 BPM | OXYGEN SATURATION: 94 %

## 2020-07-17 PROCEDURE — 99239 HOSP IP/OBS DSCHRG MGMT >30: CPT | Performed by: PSYCHIATRY & NEUROLOGY

## 2020-07-17 PROCEDURE — 6370000000 HC RX 637 (ALT 250 FOR IP): Performed by: PSYCHIATRY & NEUROLOGY

## 2020-07-17 RX ORDER — PRAZOSIN HYDROCHLORIDE 1 MG/1
3 CAPSULE ORAL NIGHTLY
Qty: 45 CAPSULE | Refills: 2 | Status: SHIPPED | OUTPATIENT
Start: 2020-07-17

## 2020-07-17 RX ORDER — VENLAFAXINE HYDROCHLORIDE 37.5 MG/1
37.5 CAPSULE, EXTENDED RELEASE ORAL
Qty: 15 CAPSULE | Refills: 3 | Status: SHIPPED | OUTPATIENT
Start: 2020-07-18

## 2020-07-17 RX ADMIN — HYDROXYZINE PAMOATE 50 MG: 50 CAPSULE ORAL at 11:01

## 2020-07-17 RX ADMIN — VENLAFAXINE HYDROCHLORIDE 37.5 MG: 37.5 CAPSULE, EXTENDED RELEASE ORAL at 08:40

## 2020-07-17 NOTE — PROGRESS NOTES
Pt. attended the 0900 community meeting.  Electronically signed by Chika Kumar on 7/17/2020 at 9:48 AM

## 2020-07-17 NOTE — PROGRESS NOTES
Discharge instructions reviewed verbally and in writing including f/u appointments. Patient verbalizes understanding and signed as such. All belongings returned for discharge. Patient provided with food/drug interaction booklet. Patient denies SI, HI, A/V hallucinations, mood is stable.    D/C with  for transport home

## 2020-07-17 NOTE — PROGRESS NOTES
CLINICAL PHARMACY NOTE: MEDS TO [address] Select Patient?: No  Total # of Prescriptions Filled: 2   The following medications were delivered to the patient:  · Venlafaxine er 37.5mg  · Prazosin 1mg  Total # of Interventions Completed: 3  Time Spent (min): 75    Additional Documentation:

## 2020-07-17 NOTE — PROGRESS NOTES
Patient did not attend Wrap Up and Relaxation Group despite staff encouragement.  Electronically signed by Kim Diaz on 7/16/2020 at 9:25 PM
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
## 2020-07-17 NOTE — GROUP NOTE
Group Therapy Note    Date: 7/17/2020    Group Start Time: 1000  Group End Time: 1100  Group Topic: Psychoeducation    [facility]    Ryan Beltran, KATHIE        Group Therapy Note    Attendees: 7         Patient's Goal:  \"to stay focused and remain on my meds after dc\"    Notes:  Pt. attended the 1000 skill group. Pt. worked on project with interest and was engaged in the group discussion. Pts.  encouraged pt to sing as she did yesterday for staff and pts. Was singing a Religious song, and became tearful when  asked her to lower voice and stop singing by the dr. Abi Martinez. has a beautiful voice and was encouraged to continue singing after dc in her Religion. Status After Intervention:  Tearful    Participation Level:  Active Listener and Interactive    Participation Quality: Appropriate, Attentive and Sharing      Speech:  normal      Thought Process/Content: Logical      Affective Functioning: Congruent      Mood: calm, happy      Level of consciousness:  Alert, Oriented x4 and Attentive      Response to Learning: Able to change behavior and Progressing to goal      Endings: None Reported    Modes of Intervention: Education, Support, Socialization and Activity      Discipline Responsible: Psychoeducational Specialist      Signature:  Dyanne Bosworth

## 2024-10-02 ENCOUNTER — LAB (OUTPATIENT)
Dept: LAB | Facility: LAB | Age: 49
End: 2024-10-02
Payer: COMMERCIAL

## 2024-10-02 DIAGNOSIS — E03.9 HYPOTHYROIDISM: ICD-10-CM

## 2024-10-02 DIAGNOSIS — E78.5 HYPERLIPIDEMIA, UNSPECIFIED: ICD-10-CM

## 2024-10-02 DIAGNOSIS — R73.9 HYPERGLYCEMIA: ICD-10-CM

## 2024-10-02 DIAGNOSIS — F41.1 GENERALIZED ANXIETY DISORDER: Primary | ICD-10-CM

## 2024-10-02 DIAGNOSIS — D51.9 VITAMIN B12 DEFICIENCY ANEMIA, UNSPECIFIED: ICD-10-CM

## 2024-10-02 DIAGNOSIS — D64.9 ANEMIA: ICD-10-CM

## 2024-10-02 DIAGNOSIS — I10 ESSENTIAL (PRIMARY) HYPERTENSION: ICD-10-CM

## 2024-10-02 DIAGNOSIS — E55.9 VITAMIN D DEFICIENCY: ICD-10-CM

## 2024-10-02 LAB
25(OH)D3 SERPL-MCNC: 18 NG/ML (ref 30–100)
ALBUMIN SERPL BCP-MCNC: 4.9 G/DL (ref 3.4–5)
ALP SERPL-CCNC: 42 U/L (ref 33–110)
ALT SERPL W P-5'-P-CCNC: 21 U/L (ref 7–45)
ANION GAP SERPL CALC-SCNC: 13 MMOL/L (ref 10–20)
APPEARANCE UR: ABNORMAL
AST SERPL W P-5'-P-CCNC: 43 U/L (ref 9–39)
BASOPHILS # BLD AUTO: 0.06 X10*3/UL (ref 0–0.1)
BASOPHILS NFR BLD AUTO: 0.8 %
BILIRUB SERPL-MCNC: 1.2 MG/DL (ref 0–1.2)
BILIRUB UR STRIP.AUTO-MCNC: NEGATIVE MG/DL
BUN SERPL-MCNC: 8 MG/DL (ref 6–23)
CALCIUM SERPL-MCNC: 9.8 MG/DL (ref 8.6–10.3)
CHLORIDE SERPL-SCNC: 103 MMOL/L (ref 98–107)
CHOLEST SERPL-MCNC: 190 MG/DL (ref 0–199)
CHOLESTEROL/HDL RATIO: 2.1
CO2 SERPL-SCNC: 25 MMOL/L (ref 21–32)
COLOR UR: YELLOW
CREAT SERPL-MCNC: 0.8 MG/DL (ref 0.5–1.05)
CREAT UR-MCNC: 194.1 MG/DL (ref 20–320)
CREAT UR-MCNC: 194.1 MG/DL (ref 20–320)
EGFRCR SERPLBLD CKD-EPI 2021: 90 ML/MIN/1.73M*2
EOSINOPHIL # BLD AUTO: 0.12 X10*3/UL (ref 0–0.7)
EOSINOPHIL NFR BLD AUTO: 1.6 %
ERYTHROCYTE [DISTWIDTH] IN BLOOD BY AUTOMATED COUNT: 13.5 % (ref 11.5–14.5)
FERRITIN SERPL-MCNC: 133 NG/ML (ref 8–150)
GLUCOSE SERPL-MCNC: 72 MG/DL (ref 74–99)
GLUCOSE UR STRIP.AUTO-MCNC: NORMAL MG/DL
HCT VFR BLD AUTO: 41.4 % (ref 36–46)
HDLC SERPL-MCNC: 92.6 MG/DL
HGB BLD-MCNC: 14.2 G/DL (ref 12–16)
IMM GRANULOCYTES # BLD AUTO: 0.03 X10*3/UL (ref 0–0.7)
IMM GRANULOCYTES NFR BLD AUTO: 0.4 % (ref 0–0.9)
IRON SATN MFR SERPL: 66 % (ref 25–45)
IRON SERPL-MCNC: 248 UG/DL (ref 35–150)
KETONES UR STRIP.AUTO-MCNC: ABNORMAL MG/DL
LDLC SERPL CALC-MCNC: 73 MG/DL
LEUKOCYTE ESTERASE UR QL STRIP.AUTO: ABNORMAL
LYMPHOCYTES # BLD AUTO: 2.25 X10*3/UL (ref 1.2–4.8)
LYMPHOCYTES NFR BLD AUTO: 29.3 %
MAGNESIUM SERPL-MCNC: 1.88 MG/DL (ref 1.6–2.4)
MCH RBC QN AUTO: 33.2 PG (ref 26–34)
MCHC RBC AUTO-ENTMCNC: 34.3 G/DL (ref 32–36)
MCV RBC AUTO: 97 FL (ref 80–100)
MICROALBUMIN UR-MCNC: 11.3 MG/L
MICROALBUMIN/CREAT UR: 5.8 UG/MG CREAT
MONOCYTES # BLD AUTO: 0.55 X10*3/UL (ref 0.1–1)
MONOCYTES NFR BLD AUTO: 7.2 %
MUCOUS THREADS #/AREA URNS AUTO: ABNORMAL /LPF
NEUTROPHILS # BLD AUTO: 4.67 X10*3/UL (ref 1.2–7.7)
NEUTROPHILS NFR BLD AUTO: 60.7 %
NITRITE UR QL STRIP.AUTO: NEGATIVE
NON HDL CHOLESTEROL: 97 MG/DL (ref 0–149)
NRBC BLD-RTO: 0 /100 WBCS (ref 0–0)
PH UR STRIP.AUTO: 5 [PH]
PLATELET # BLD AUTO: 180 X10*3/UL (ref 150–450)
POTASSIUM SERPL-SCNC: 4.3 MMOL/L (ref 3.5–5.3)
PROT SERPL-MCNC: 7.3 G/DL (ref 6.4–8.2)
PROT UR STRIP.AUTO-MCNC: NEGATIVE MG/DL
PROT UR-ACNC: 17 MG/DL (ref 5–24)
PROT/CREAT UR: 0.09 MG/MG CREAT (ref 0–0.17)
RBC # BLD AUTO: 4.28 X10*6/UL (ref 4–5.2)
RBC # UR STRIP.AUTO: ABNORMAL /UL
RBC #/AREA URNS AUTO: ABNORMAL /HPF
SODIUM SERPL-SCNC: 137 MMOL/L (ref 136–145)
SP GR UR STRIP.AUTO: 1.02
SQUAMOUS #/AREA URNS AUTO: ABNORMAL /HPF
T4 SERPL-MCNC: 3.6 UG/DL (ref 4.5–11.1)
TIBC SERPL-MCNC: 376 UG/DL (ref 240–445)
TRIGL SERPL-MCNC: 123 MG/DL (ref 0–149)
TSH SERPL-ACNC: 0.66 MIU/L (ref 0.44–3.98)
UIBC SERPL-MCNC: 128 UG/DL (ref 110–370)
UROBILINOGEN UR STRIP.AUTO-MCNC: NORMAL MG/DL
VIT B12 SERPL-MCNC: 198 PG/ML (ref 211–911)
VLDL: 25 MG/DL (ref 0–40)
WBC # BLD AUTO: 7.7 X10*3/UL (ref 4.4–11.3)
WBC #/AREA URNS AUTO: ABNORMAL /HPF

## 2024-10-02 PROCEDURE — 82728 ASSAY OF FERRITIN: CPT

## 2024-10-02 PROCEDURE — 82043 UR ALBUMIN QUANTITATIVE: CPT

## 2024-10-02 PROCEDURE — 36415 COLL VENOUS BLD VENIPUNCTURE: CPT

## 2024-10-02 PROCEDURE — 83540 ASSAY OF IRON: CPT

## 2024-10-02 PROCEDURE — 80061 LIPID PANEL: CPT

## 2024-10-02 PROCEDURE — 84156 ASSAY OF PROTEIN URINE: CPT

## 2024-10-02 PROCEDURE — 84443 ASSAY THYROID STIM HORMONE: CPT

## 2024-10-02 PROCEDURE — 81001 URINALYSIS AUTO W/SCOPE: CPT

## 2024-10-02 PROCEDURE — 82306 VITAMIN D 25 HYDROXY: CPT

## 2024-10-02 PROCEDURE — 85025 COMPLETE CBC W/AUTO DIFF WBC: CPT

## 2024-10-02 PROCEDURE — 82607 VITAMIN B-12: CPT

## 2024-10-02 PROCEDURE — 82570 ASSAY OF URINE CREATININE: CPT

## 2024-10-02 PROCEDURE — 83735 ASSAY OF MAGNESIUM: CPT

## 2024-10-02 PROCEDURE — 80053 COMPREHEN METABOLIC PANEL: CPT

## 2024-10-02 PROCEDURE — 83550 IRON BINDING TEST: CPT

## 2024-10-02 PROCEDURE — 84436 ASSAY OF TOTAL THYROXINE: CPT

## 2024-10-09 ENCOUNTER — APPOINTMENT (OUTPATIENT)
Dept: GASTROENTEROLOGY | Facility: CLINIC | Age: 49
End: 2024-10-09
Payer: COMMERCIAL

## 2024-10-09 ENCOUNTER — TELEPHONE (OUTPATIENT)
Dept: GASTROENTEROLOGY | Facility: CLINIC | Age: 49
End: 2024-10-09

## 2024-10-09 NOTE — TELEPHONE ENCOUNTER
Provider attempted to call patient 4 times, went to voice mail all 4 times and never connected with patient.  Will call patient to reschedule.    Left voice mail asking patient to call back to reschedule.

## 2024-10-17 ENCOUNTER — HOSPITAL ENCOUNTER (OUTPATIENT)
Dept: RADIOLOGY | Facility: HOSPITAL | Age: 49
Discharge: HOME | End: 2024-10-17
Payer: COMMERCIAL

## 2024-10-17 DIAGNOSIS — R10.10 UPPER ABDOMINAL PAIN, UNSPECIFIED: ICD-10-CM

## 2024-10-17 PROCEDURE — 74022 RADEX COMPL AQT ABD SERIES: CPT | Performed by: RADIOLOGY

## 2024-10-17 PROCEDURE — 74022 RADEX COMPL AQT ABD SERIES: CPT

## 2024-10-24 ENCOUNTER — HOSPITAL ENCOUNTER (OUTPATIENT)
Dept: RADIOLOGY | Facility: CLINIC | Age: 49
Discharge: HOME | End: 2024-10-24
Payer: COMMERCIAL

## 2024-10-24 ENCOUNTER — HOSPITAL ENCOUNTER (OUTPATIENT)
Dept: RADIOLOGY | Facility: HOSPITAL | Age: 49
Discharge: HOME | End: 2024-10-24
Payer: COMMERCIAL

## 2024-10-24 VITALS — WEIGHT: 127 LBS | BODY MASS INDEX: 21.68 KG/M2 | HEIGHT: 64 IN

## 2024-10-24 DIAGNOSIS — Z12.31 ENCOUNTER FOR SCREENING MAMMOGRAM FOR MALIGNANT NEOPLASM OF BREAST: ICD-10-CM

## 2024-10-24 PROCEDURE — 77066 DX MAMMO INCL CAD BI: CPT | Performed by: RADIOLOGY

## 2024-10-24 PROCEDURE — 76642 ULTRASOUND BREAST LIMITED: CPT | Mod: RT

## 2024-10-24 PROCEDURE — 77062 BREAST TOMOSYNTHESIS BI: CPT

## 2024-10-24 PROCEDURE — 77062 BREAST TOMOSYNTHESIS BI: CPT | Performed by: RADIOLOGY

## 2024-10-24 PROCEDURE — 76642 ULTRASOUND BREAST LIMITED: CPT | Performed by: RADIOLOGY

## 2024-11-19 ENCOUNTER — LAB (OUTPATIENT)
Dept: LAB | Facility: LAB | Age: 49
End: 2024-11-19
Payer: COMMERCIAL

## 2024-11-19 ENCOUNTER — APPOINTMENT (OUTPATIENT)
Dept: GASTROENTEROLOGY | Facility: CLINIC | Age: 49
End: 2024-11-19
Payer: COMMERCIAL

## 2024-11-19 VITALS
BODY MASS INDEX: 21.85 KG/M2 | WEIGHT: 128 LBS | DIASTOLIC BLOOD PRESSURE: 90 MMHG | OXYGEN SATURATION: 97 % | HEART RATE: 87 BPM | SYSTOLIC BLOOD PRESSURE: 150 MMHG | HEIGHT: 64 IN

## 2024-11-19 DIAGNOSIS — K52.9 CHRONIC DIARRHEA: Primary | ICD-10-CM

## 2024-11-19 DIAGNOSIS — R79.89 ELEVATED LFTS: ICD-10-CM

## 2024-11-19 DIAGNOSIS — K52.9 CHRONIC DIARRHEA: ICD-10-CM

## 2024-11-19 LAB
APAP SERPL-MCNC: <10 UG/ML
CERULOPLASMIN SERPL-MCNC: 24.2 MG/DL (ref 20–60)
CRP SERPL-MCNC: <0.1 MG/DL
ERYTHROCYTE [SEDIMENTATION RATE] IN BLOOD BY WESTERGREN METHOD: <1 MM/H (ref 0–20)
ETHANOL SERPL-MCNC: <10 MG/DL
GLIADIN PEPTIDE IGA SER IA-ACNC: 1 U/ML
HAV IGM SER QL: NONREACTIVE
HBV CORE IGM SER QL: NONREACTIVE
HBV SURFACE AG SERPL QL IA: NONREACTIVE
HCV AB SER QL: NONREACTIVE
SALICYLATES SERPL-MCNC: <3 MG/DL
TTG IGA SER IA-ACNC: <1 U/ML

## 2024-11-19 PROCEDURE — 83516 IMMUNOASSAY NONANTIBODY: CPT

## 2024-11-19 PROCEDURE — 80143 DRUG ASSAY ACETAMINOPHEN: CPT

## 2024-11-19 PROCEDURE — 3008F BODY MASS INDEX DOCD: CPT | Performed by: NURSE PRACTITIONER

## 2024-11-19 PROCEDURE — 80320 DRUG SCREEN QUANTALCOHOLS: CPT

## 2024-11-19 PROCEDURE — 80074 ACUTE HEPATITIS PANEL: CPT

## 2024-11-19 PROCEDURE — 85652 RBC SED RATE AUTOMATED: CPT

## 2024-11-19 PROCEDURE — 99204 OFFICE O/P NEW MOD 45 MIN: CPT | Performed by: NURSE PRACTITIONER

## 2024-11-19 PROCEDURE — 86256 FLUORESCENT ANTIBODY TITER: CPT

## 2024-11-19 PROCEDURE — 86038 ANTINUCLEAR ANTIBODIES: CPT

## 2024-11-19 PROCEDURE — 80179 DRUG ASSAY SALICYLATE: CPT

## 2024-11-19 PROCEDURE — 86381 MITOCHONDRIAL ANTIBODY EACH: CPT

## 2024-11-19 PROCEDURE — 86140 C-REACTIVE PROTEIN: CPT

## 2024-11-19 PROCEDURE — 36415 COLL VENOUS BLD VENIPUNCTURE: CPT

## 2024-11-19 PROCEDURE — 81256 HFE GENE: CPT

## 2024-11-19 PROCEDURE — 82390 ASSAY OF CERULOPLASMIN: CPT

## 2024-11-19 PROCEDURE — 86015 ACTIN ANTIBODY EACH: CPT

## 2024-11-19 RX ORDER — PRAZOSIN HYDROCHLORIDE 1 MG/1
1 CAPSULE ORAL NIGHTLY
COMMUNITY
Start: 2024-10-05

## 2024-11-19 RX ORDER — TRAZODONE HYDROCHLORIDE 50 MG/1
50 TABLET ORAL
COMMUNITY

## 2024-11-19 RX ORDER — PANTOPRAZOLE SODIUM 40 MG/1
1 TABLET, DELAYED RELEASE ORAL
COMMUNITY
Start: 2024-10-17

## 2024-11-19 RX ORDER — AMLODIPINE BESYLATE 5 MG/1
5 TABLET ORAL DAILY
COMMUNITY

## 2024-11-19 RX ORDER — VENLAFAXINE HYDROCHLORIDE 75 MG/1
CAPSULE, EXTENDED RELEASE ORAL
COMMUNITY
Start: 2024-10-05

## 2024-11-19 RX ORDER — HYDROXYZINE PAMOATE 50 MG/1
CAPSULE ORAL
COMMUNITY
Start: 2024-11-01

## 2024-11-19 RX ORDER — LOSARTAN POTASSIUM 50 MG/1
1 TABLET ORAL
COMMUNITY
Start: 2024-10-18

## 2024-11-19 RX ORDER — ARIPIPRAZOLE 10 MG/1
10 TABLET ORAL NIGHTLY
COMMUNITY
Start: 2024-11-11

## 2024-11-19 RX ORDER — BUSPIRONE HYDROCHLORIDE 10 MG/1
TABLET ORAL
COMMUNITY
Start: 2024-10-05

## 2024-11-19 RX ORDER — ASPIRIN 325 MG
TABLET, DELAYED RELEASE (ENTERIC COATED) ORAL
COMMUNITY
Start: 2024-10-18

## 2024-11-19 RX ORDER — ERGOCALCIFEROL 1.25 MG/1
1 CAPSULE ORAL
COMMUNITY
Start: 2019-06-10

## 2024-11-19 NOTE — PROGRESS NOTES
"Assessment/Plan   Tootie Bear is a 48 y.o. female for HTN, chronic headache anxiety and depression who presents to GI clinic for lower abdominal cramping with diarrhea.  Noted elevated AST, total bilirubin.  History of alcohol abuse    Chronic diarrhea  -Checks celiac serology panel, sed rate, CRP  -Check stool studies: Fecal calprotectin, stool pathogen PCR, ova and parasite and C. Difficile  -Recommend screening colonoscopy +/- random biopsies, timing to be determined    2.   Persistent elevation in liver enzymes  -Excessive alcohol intake  -Elevated iron iron 248, iron saturation 66%, ferritin 133.  -No family history of chronic liver disease  -Check ultrasound the abdomen with doppler  -Check RANDY, SMA, AMA, ceruloplasmin, acute toxicology and acute hepatitis panel  -Patient instructed to abstain from alcohol  -Discussed with Dr. Jones regarding anxiety/depression and alcohol abuse  -Consider AA meetings, alcohol recovery program  -follow-up with GI in 4 weeks      TOMER Bass-CNP      Subjective     History of Present Illness:   Tootie Bear is a 48 y.o. female for HTN, chronic headache anxiety and depression who presents to GI clinic for abdominal pain and diarrhea.  Patient reports having 6-8 loose to watery brown bowel movements daily associated with abdominal cramping.  Denies black or bloody stools.  Patient also endorses having frequent UTIs and treated with antibiotics often.  She denies epigastric pain, nausea, vomiting, constipation, unintentional weight loss.  Patient states she drinks alcohol daily.  She used to drink liquor daily however stopped a few months ago and is currently only drinking \"couple of beers\" every day.  Patient is not exactly quantifying how much she drank or how long she drinks but admits to excessive drinking for 20 years.  Review of blood work shows elevation in total bilirubin and AST over the last 4+ years.  No CLD workup has been initiated.    Review of " "Systems  ROS Negative unless otherwise stated above.    Past Medical History   has no past medical history on file.     Social History   reports that she has been smoking cigarettes. She does not have any smokeless tobacco history on file. She reports current drug use. Drug: Marijuana.  History of alcohol abuse.  Drinking \"couple beers\" every day, history of daily drinking, was drinking liquor for many years but quit last year    Family History  Unknown    Allergies  No Known Allergies    Medications  Current Outpatient Medications   Medication Instructions    amLODIPine (NORVASC) 5 mg, Daily    ARIPiprazole (ABILIFY) 10 mg, Nightly    busPIRone (Buspar) 10 mg tablet     cholecalciferol (Vitamin D-3) 50,000 unit capsule     ergocalciferol (Vitamin D-2) 1.25 MG (84100 UT) capsule 1 capsule, Once Weekly    hydrOXYzine pamoate (Vistaril) 50 mg capsule     losartan (Cozaar) 50 mg tablet 1 tablet, Daily (0630)    pantoprazole (ProtoNix) 40 mg EC tablet 1 tablet, Daily (0630)    prazosin (MINIPRESS) 1 mg, Nightly    traZODone (DESYREL) 50 mg    venlafaxine XR (Effexor-XR) 75 mg 24 hr capsule         Objective   Visit Vitals  /90   Pulse 87     General: A&Ox3, NAD.  HEENT: AT/NC.   CV: RRR. No murmur.  Resp: CTA bilaterally. No wheezing, rhonchi or rales.   GI: Soft, NT/ND. BSx4.  Extrem: No edema. Pulses intact.  Skin: No Jaundice.   Neuro: No focal deficits.   Psych: Normal mood and affect.     Lab Results   Component Value Date    WBC 7.7 10/02/2024    WBC 10.1 12/12/2022    WBC 10.3 06/28/2020    HGB 14.2 10/02/2024    HGB 13.5 12/12/2022    HGB 15.8 06/28/2020    HCT 41.4 10/02/2024    HCT 39.0 12/12/2022    HCT 44.5 06/28/2020     10/02/2024     12/12/2022     (L) 06/28/2020     Lab Results   Component Value Date     10/02/2024    K 4.3 10/02/2024     10/02/2024    CO2 25 10/02/2024    BUN 8 10/02/2024    CREATININE 0.80 10/02/2024    GLUCOSE 72 (L) 10/02/2024    CALCIUM 9.8 " 10/02/2024       Lab Results   Component Value Date    ALKPHOS 42 10/02/2024    ALKPHOS 46 12/12/2022    ALKPHOS 45 06/28/2020    BILITOT 1.2 10/02/2024    BILITOT 1.2 12/12/2022    BILITOT 1.8 (H) 06/28/2020    BILIDIR 0.3 06/28/2020    PROT 7.3 10/02/2024    PROT 7.4 12/12/2022    PROT 8.2 06/28/2020    ALT 21 10/02/2024    ALT 25 12/12/2022    ALT 14 06/28/2020    AST 43 (H) 10/02/2024    AST 41 (H) 12/12/2022    AST 18 06/28/2020

## 2024-11-20 ENCOUNTER — LAB (OUTPATIENT)
Dept: LAB | Facility: LAB | Age: 49
End: 2024-11-20
Payer: COMMERCIAL

## 2024-11-20 DIAGNOSIS — K52.9 CHRONIC DIARRHEA: ICD-10-CM

## 2024-11-20 LAB
ANA SER QL HEP2 SUBST: NEGATIVE
MITOCHONDRIA AB SER QL IF: NEGATIVE

## 2024-11-20 PROCEDURE — 87506 IADNA-DNA/RNA PROBE TQ 6-11: CPT

## 2024-11-20 PROCEDURE — 83993 ASSAY FOR CALPROTECTIN FECAL: CPT

## 2024-11-20 PROCEDURE — 87328 CRYPTOSPORIDIUM AG IA: CPT

## 2024-11-20 PROCEDURE — 87329 GIARDIA AG IA: CPT

## 2024-11-21 LAB
C COLI+JEJ+UPSA DNA STL QL NAA+PROBE: NOT DETECTED
EC STX1 GENE STL QL NAA+PROBE: NOT DETECTED
EC STX2 GENE STL QL NAA+PROBE: NOT DETECTED
GLIADIN PEPTIDE IGG SER IA-ACNC: <0.56 FLU (ref 0–4.99)
NOROVIRUS GI + GII RNA STL NAA+PROBE: NOT DETECTED
RV RNA STL NAA+PROBE: NOT DETECTED
SALMONELLA DNA STL QL NAA+PROBE: NOT DETECTED
SHIGELLA DNA SPEC QL NAA+PROBE: NOT DETECTED
SMOOTH MUSCLE AB SER QL IF: ABNORMAL
TTG IGG SER IA-ACNC: <0.82 FLU (ref 0–4.99)
V CHOLERAE DNA STL QL NAA+PROBE: NOT DETECTED
Y ENTEROCOL DNA STL QL NAA+PROBE: NOT DETECTED

## 2024-11-22 LAB
CALPROTECTIN STL-MCNT: 9 UG/G
CRYPTOSP AG STL QL IA: NEGATIVE
G LAMBLIA AG STL QL IA: NEGATIVE

## 2024-11-26 LAB
ELECTRONICALLY SIGNED BY: NORMAL
HFE GENE MUT TESTED BLD/T: NORMAL
HFE P.C282Y BLD/T QL: NORMAL
HFE P.H63D BLD/T QL: NORMAL
O+P STL MICRO: NEGATIVE

## 2024-11-27 DIAGNOSIS — R79.89 ELEVATED LFTS: Primary | ICD-10-CM

## 2024-11-29 ENCOUNTER — HOSPITAL ENCOUNTER (OUTPATIENT)
Dept: RADIOLOGY | Facility: HOSPITAL | Age: 49
Discharge: HOME | End: 2024-11-29
Payer: COMMERCIAL

## 2024-11-29 DIAGNOSIS — R79.89 ELEVATED LFTS: ICD-10-CM

## 2024-11-29 DIAGNOSIS — K52.9 CHRONIC DIARRHEA: ICD-10-CM

## 2024-11-29 PROCEDURE — 76705 ECHO EXAM OF ABDOMEN: CPT

## 2024-11-29 PROCEDURE — 76700 US EXAM ABDOM COMPLETE: CPT | Performed by: RADIOLOGY

## 2024-11-29 PROCEDURE — 76700 US EXAM ABDOM COMPLETE: CPT

## 2024-12-10 ENCOUNTER — OFFICE VISIT (OUTPATIENT)
Dept: GASTROENTEROLOGY | Facility: CLINIC | Age: 49
End: 2024-12-10
Payer: COMMERCIAL

## 2024-12-10 NOTE — PROGRESS NOTES
"Assessment/Plan   Tootie Bear is a 48 y.o. female who presents to GI clinic for ***.  Tootie Bear is a 48 y.o. female for HTN, chronic headache anxiety and depression who presents to GI clinic for lower abdominal cramping with diarrhea.  Noted elevated AST, total bilirubin.  History of alcohol abuse     Chronic diarrhea  -Checks celiac serology panel, sed rate, CRP  -Check stool studies: Fecal calprotectin, stool pathogen PCR, ova and parasite and C. Difficile  -Recommend screening colonoscopy +/- random biopsies, timing to be determined     2.   Persistent elevation in liver enzymes  -Excessive alcohol intake  -Elevated iron iron 248, iron saturation 66%, ferritin 133.  -No family history of chronic liver disease  -Check ultrasound the abdomen with doppler  -Check RANDY, SMA, AMA, ceruloplasmin, acute toxicology and acute hepatitis panel  -Patient instructed to abstain from alcohol  -Discussed with Dr. Jones regarding anxiety/depression and alcohol abuse  -Consider AA meetings, alcohol recovery program  -follow-up with GI in 4 weeks     TOMER Bass-[facility]         Subjective     History of Present Illness:   Tootie Bear is a 48 y.o. female who presents to GI clinic for ***.    LOV 11/19/24  Tootie Bear is a 48 y.o. female for HTN, chronic headache anxiety and depression who presents to GI clinic for abdominal pain and diarrhea.  Patient reports having 6-8 loose to watery brown bowel movements daily associated with abdominal cramping.  Denies black or bloody stools.  Patient also endorses having frequent UTIs and treated with antibiotics often.  She denies epigastric pain, nausea, vomiting, constipation, unintentional weight loss.  Patient states she drinks alcohol daily.  She used to drink liquor daily however stopped a few months ago and is currently only drinking \"couple of beers\" every day.  Patient is not exactly quantifying how much she drank or how long she drinks but admits to excessive " drinking for 20 years.  Review of blood work shows elevation in total bilirubin and AST over the last 4+ years.  No CLD workup has been initiated.     Review of Systems  ROS Negative unless otherwise stated above.    Past Medical History  Hypertension, PTSD, mood disorder, asthma, GERD, chronic lower back pain, medical noncompliance    Social History   reports that she has been smoking cigarettes. She does not have any smokeless tobacco history on file. She reports current drug use. Drug: Marijuana.     Family History  family history is not on file.     Allergies  No Known Allergies    Medications  Current Outpatient Medications   Medication Instructions    amLODIPine (NORVASC) 5 mg, Daily    ARIPiprazole (ABILIFY) 10 mg, Nightly    busPIRone (Buspar) 10 mg tablet     cholecalciferol (Vitamin D-3) 50,000 unit capsule     ergocalciferol (Vitamin D-2) 1.25 MG (00316 UT) capsule 1 capsule, Once Weekly    hydrOXYzine pamoate (Vistaril) 50 mg capsule     losartan (Cozaar) 50 mg tablet 1 tablet, Daily (0630)    pantoprazole (ProtoNix) 40 mg EC tablet 1 tablet, Daily (0630)    prazosin (MINIPRESS) 1 mg, Nightly    traZODone (DESYREL) 50 mg    venlafaxine XR (Effexor-XR) 75 mg 24 hr capsule         Objective   There were no vitals taken for this visit.   .FLOWAMB[14      General: A&Ox3, NAD.  HEENT: AT/NC.   CV: RRR. No murmur.  Resp: CTA bilaterally. No wheezing, rhonchi or rales.   GI: Soft, NT/ND. BSx4.  Extrem: No edema. Pulses intact.  Skin: No Jaundice.   Neuro: No focal deficits.   Psych: Normal mood and affect.     Lab Results   Component Value Date    WBC 7.7 10/02/2024    WBC 10.1 12/12/2022    WBC 10.3 06/28/2020    HGB 14.2 10/02/2024    HGB 13.5 12/12/2022    HGB 15.8 06/28/2020    HCT 41.4 10/02/2024    HCT 39.0 12/12/2022    HCT 44.5 06/28/2020     10/02/2024     12/12/2022     (L) 06/28/2020     Lab Results   Component Value Date     10/02/2024    K 4.3 10/02/2024      "10/02/2024    CO2 25 10/02/2024    BUN 8 10/02/2024    CREATININE 0.80 10/02/2024    GLUCOSE 72 (L) 10/02/2024    CALCIUM 9.8 10/02/2024       Lab Results   Component Value Date    ALKPHOS 42 10/02/2024    ALKPHOS 46 12/12/2022    ALKPHOS 45 06/28/2020    BILITOT 1.2 10/02/2024    BILITOT 1.2 12/12/2022    BILITOT 1.8 (H) 06/28/2020    BILIDIR 0.3 06/28/2020    PROT 7.3 10/02/2024    PROT 7.4 12/12/2022    PROT 8.2 06/28/2020    ALT 21 10/02/2024    ALT 25 12/12/2022    ALT 14 06/28/2020    AST 43 (H) 10/02/2024    AST 41 (H) 12/12/2022    AST 18 06/28/2020      No results found for: \"INR\"                             "

## 2024-12-11 ENCOUNTER — TELEPHONE (OUTPATIENT)
Dept: GASTROENTEROLOGY | Facility: CLINIC | Age: 49
End: 2024-12-11

## 2024-12-11 ENCOUNTER — OFFICE VISIT (OUTPATIENT)
Dept: GASTROENTEROLOGY | Facility: CLINIC | Age: 49
End: 2024-12-11
Payer: COMMERCIAL

## 2024-12-11 NOTE — TELEPHONE ENCOUNTER
I called patient to let her know that AYALA Dennison will call her tomorrow, 12/12/24.  She was not able to call today.

## 2024-12-12 ENCOUNTER — OFFICE VISIT (OUTPATIENT)
Dept: GASTROENTEROLOGY | Facility: CLINIC | Age: 49
End: 2024-12-12
Payer: COMMERCIAL

## 2024-12-12 DIAGNOSIS — K52.9 CHRONIC DIARRHEA: Primary | ICD-10-CM

## 2024-12-12 DIAGNOSIS — K76.0 HEPATIC STEATOSIS: ICD-10-CM

## 2024-12-12 DIAGNOSIS — Z12.11 COLON CANCER SCREENING: ICD-10-CM

## 2024-12-12 PROCEDURE — 99214 OFFICE O/P EST MOD 30 MIN: CPT | Performed by: NURSE PRACTITIONER

## 2024-12-12 RX ORDER — POLYETHYLENE GLYCOL 3350, SODIUM SULFATE ANHYDROUS, SODIUM BICARBONATE, SODIUM CHLORIDE, POTASSIUM CHLORIDE 236; 22.74; 6.74; 5.86; 2.97 G/4L; G/4L; G/4L; G/4L; G/4L
4000 POWDER, FOR SOLUTION ORAL ONCE
Qty: 1 ML | Refills: 0 | Status: SHIPPED | OUTPATIENT
Start: 2024-12-12 | End: 2024-12-12

## 2024-12-16 ENCOUNTER — CLINICAL SUPPORT (OUTPATIENT)
Dept: PREADMISSION TESTING | Facility: HOSPITAL | Age: 49
End: 2024-12-16
Payer: COMMERCIAL

## 2024-12-16 VITALS — HEIGHT: 64 IN | BODY MASS INDEX: 21.83 KG/M2 | WEIGHT: 127.87 LBS

## 2024-12-16 RX ORDER — SULFAMETHOXAZOLE AND TRIMETHOPRIM 800; 160 MG/1; MG/1
1 TABLET ORAL
COMMUNITY
Start: 2024-12-06

## 2024-12-16 RX ORDER — ZINC GLUCONATE 50 MG
1 TABLET ORAL
COMMUNITY
Start: 2024-10-18

## 2024-12-16 NOTE — PREPROCEDURE INSTRUCTIONS

## 2024-12-17 ENCOUNTER — APPOINTMENT (OUTPATIENT)
Dept: GASTROENTEROLOGY | Facility: CLINIC | Age: 49
End: 2024-12-17
Payer: COMMERCIAL

## 2024-12-19 ENCOUNTER — ANESTHESIA EVENT (OUTPATIENT)
Dept: GASTROENTEROLOGY | Facility: HOSPITAL | Age: 49
End: 2024-12-19
Payer: COMMERCIAL

## 2024-12-19 ENCOUNTER — HOSPITAL ENCOUNTER (OUTPATIENT)
Dept: GASTROENTEROLOGY | Facility: HOSPITAL | Age: 49
Discharge: HOME | End: 2024-12-19
Payer: COMMERCIAL

## 2024-12-19 ENCOUNTER — ANESTHESIA (OUTPATIENT)
Dept: GASTROENTEROLOGY | Facility: HOSPITAL | Age: 49
End: 2024-12-19
Payer: COMMERCIAL

## 2024-12-19 VITALS
HEART RATE: 72 BPM | HEIGHT: 64 IN | OXYGEN SATURATION: 100 % | WEIGHT: 129.19 LBS | DIASTOLIC BLOOD PRESSURE: 81 MMHG | SYSTOLIC BLOOD PRESSURE: 165 MMHG | BODY MASS INDEX: 22.06 KG/M2 | RESPIRATION RATE: 16 BRPM | TEMPERATURE: 98.2 F

## 2024-12-19 DIAGNOSIS — K52.9 CHRONIC DIARRHEA: ICD-10-CM

## 2024-12-19 DIAGNOSIS — Z12.11 COLON CANCER SCREENING: ICD-10-CM

## 2024-12-19 PROBLEM — K76.0 FATTY LIVER: Status: ACTIVE | Noted: 2024-12-19

## 2024-12-19 PROBLEM — I10 HTN (HYPERTENSION): Status: ACTIVE | Noted: 2024-12-19

## 2024-12-19 PROBLEM — J45.909 ASTHMA: Status: ACTIVE | Noted: 2024-12-19

## 2024-12-19 PROBLEM — F41.9 ANXIETY: Status: ACTIVE | Noted: 2024-12-19

## 2024-12-19 PROCEDURE — 3700000001 HC GENERAL ANESTHESIA TIME - INITIAL BASE CHARGE

## 2024-12-19 PROCEDURE — 45380 COLONOSCOPY AND BIOPSY: CPT | Performed by: STUDENT IN AN ORGANIZED HEALTH CARE EDUCATION/TRAINING PROGRAM

## 2024-12-19 PROCEDURE — 7100000010 HC PHASE TWO TIME - EACH INCREMENTAL 1 MINUTE

## 2024-12-19 PROCEDURE — 2720000007 HC OR 272 NO HCPCS

## 2024-12-19 PROCEDURE — 2500000004 HC RX 250 GENERAL PHARMACY W/ HCPCS (ALT 636 FOR OP/ED): Performed by: NURSE ANESTHETIST, CERTIFIED REGISTERED

## 2024-12-19 PROCEDURE — 7100000009 HC PHASE TWO TIME - INITIAL BASE CHARGE

## 2024-12-19 PROCEDURE — 3700000002 HC GENERAL ANESTHESIA TIME - EACH INCREMENTAL 1 MINUTE

## 2024-12-19 PROCEDURE — 45385 COLONOSCOPY W/LESION REMOVAL: CPT | Performed by: STUDENT IN AN ORGANIZED HEALTH CARE EDUCATION/TRAINING PROGRAM

## 2024-12-19 RX ORDER — LIDOCAINE HYDROCHLORIDE 20 MG/ML
INJECTION, SOLUTION INFILTRATION; PERINEURAL AS NEEDED
Status: DISCONTINUED | OUTPATIENT
Start: 2024-12-19 | End: 2024-12-19

## 2024-12-19 RX ORDER — MIDAZOLAM HYDROCHLORIDE 1 MG/ML
INJECTION, SOLUTION INTRAMUSCULAR; INTRAVENOUS AS NEEDED
Status: DISCONTINUED | OUTPATIENT
Start: 2024-12-19 | End: 2024-12-19

## 2024-12-19 RX ORDER — PROPOFOL 10 MG/ML
INJECTION, EMULSION INTRAVENOUS AS NEEDED
Status: DISCONTINUED | OUTPATIENT
Start: 2024-12-19 | End: 2024-12-19

## 2024-12-19 SDOH — HEALTH STABILITY: MENTAL HEALTH: CURRENT SMOKER: 1

## 2024-12-19 ASSESSMENT — PAIN - FUNCTIONAL ASSESSMENT
PAIN_FUNCTIONAL_ASSESSMENT: 0-10

## 2024-12-19 ASSESSMENT — PAIN SCALES - GENERAL
PAINLEVEL_OUTOF10: 0 - NO PAIN

## 2024-12-19 ASSESSMENT — COLUMBIA-SUICIDE SEVERITY RATING SCALE - C-SSRS
2. HAVE YOU ACTUALLY HAD ANY THOUGHTS OF KILLING YOURSELF?: NO
1. IN THE PAST MONTH, HAVE YOU WISHED YOU WERE DEAD OR WISHED YOU COULD GO TO SLEEP AND NOT WAKE UP?: NO
6. HAVE YOU EVER DONE ANYTHING, STARTED TO DO ANYTHING, OR PREPARED TO DO ANYTHING TO END YOUR LIFE?: NO

## 2024-12-19 NOTE — ANESTHESIA POSTPROCEDURE EVALUATION
Patient: Tootie Bear    Procedure Summary       Date: 12/19/24 Room / Location: [facility]    Anesthesia Start: 1312 Anesthesia Stop: 1351    Procedure: COLONOSCOPY Diagnosis:       Chronic diarrhea      Colon cancer screening    Scheduled Providers: Jacqueline Rodarte MD; Abrahan Klein DO Responsible Provider: Abrahan Klein DO    Anesthesia Type: MAC ASA Status: 2            Anesthesia Type: MAC    Vitals Value Taken Time   /67 12/19/24 1351   Temp 36 12/19/24 1351   Pulse 78 12/19/24 1351   Resp 14 12/19/24 1351   SpO2 99 12/19/24 1351       Anesthesia Post Evaluation    Patient location during evaluation: PACU  Patient participation: complete - patient participated  Level of consciousness: awake  Pain management: adequate  Airway patency: patent  Cardiovascular status: acceptable, blood pressure returned to baseline and hemodynamically stable  Respiratory status: acceptable, nonlabored ventilation, spontaneous ventilation and room air  Hydration status: acceptable  Postoperative Nausea and Vomiting: none        No notable events documented.

## 2024-12-19 NOTE — Clinical Note
Huddle and Timeout completed together with team. Patient wristband and OJ information verified.  Anesthesia safety check completed

## 2024-12-19 NOTE — ANESTHESIA PREPROCEDURE EVALUATION
Tootie Bear is a 48 y.o. female here for:    Colonoscopy  With Jacqueline Rodarte MD  Chronic diarrhea  Colon cancer screening    Relevant Problems   Cardiac   (+) HTN (hypertension)      Pulmonary   (+) Asthma      Neuro  ETOH abuse   (+) Anxiety      Liver   (+) Fatty liver       Lab Results   Component Value Date    HGB 14.2 10/02/2024    HCT 41.4 10/02/2024    WBC 7.7 10/02/2024     10/02/2024     10/02/2024    K 4.3 10/02/2024     10/02/2024    CREATININE 0.80 10/02/2024    BUN 8 10/02/2024       Social History     Tobacco Use   Smoking Status Every Day    Types: Cigarettes   Smokeless Tobacco Never       No Known Allergies    Current Outpatient Medications   Medication Instructions    amLODIPine (NORVASC) 5 mg, Daily    ARIPiprazole (ABILIFY) 10 mg, oral, Nightly PRN    busPIRone (Buspar) 10 mg tablet Take 1 tablet (10 mg) by mouth 2 times a day as needed.    cholecalciferol (Vitamin D-3) 50,000 unit capsule Take 1 capsule (50,000 Units) by mouth 1 (one) time per week.    hydrOXYzine pamoate (Vistaril) 50 mg capsule Take 1 capsule (50 mg) by mouth if needed.    losartan (Cozaar) 50 mg tablet 1 tablet, Daily (0630)    pantoprazole (ProtoNix) 40 mg EC tablet 1 tablet, Daily (0630)    prazosin (MINIPRESS) 1 mg, Nightly    sulfamethoxazole-trimethoprim (Bactrim DS) 800-160 mg tablet 1 tablet, Every 12 hours scheduled (0630,1830)    Vitamin B-12 1,000 mcg tablet 1 tablet, Daily (0630)       Past Surgical History:   Procedure Laterality Date    COLONOSCOPY      DENTAL SURGERY      ESOPHAGOGASTRODUODENOSCOPY      HYSTERECTOMY         No family history on file.    NPO Details:  No data recorded    Physical Exam    Airway  Mallampati: II  TM distance: >3 FB  Neck ROM: full     Cardiovascular - normal exam     Dental - normal exam     Pulmonary - normal exam     Abdominal            Anesthesia Plan    History of general anesthesia?: yes  History of complications of general anesthesia?: no    ASA 2      MAC     The patient is a current smoker.    intravenous induction   Anesthetic plan and risks discussed with patient.

## 2024-12-19 NOTE — DISCHARGE INSTRUCTIONS
Physician Phone List Provided  Patient Instructions after a Colonoscopy      The anesthetics, sedatives or narcotics which were given to you today will be acting in your body for the next 24 hours, so you might feel a little sleepy or groggy.  This feeling should slowly wear off. Carefully read and follow the instructions.     You received sedation today:  - Do not drive or operate any machinery or power tools of any kind.   - No alcoholic beverages today, not even beer or wine.  - Do not make any important decisions or sign any legal documents.  - No over the counter medications that contain alcohol or that may cause drowsiness.  - Do not make any important decisions or sign any legal documents.    While it is common to experience mild to moderate abdominal distention, gas, or belching after your procedure, if any of these symptoms occur following discharge from the GI Lab or within one week of having your procedure, call the [facility] to be advised whether a visit to your nearest Urgent Care or Emergency Department is indicated.  Take this paper with you if you go.     - If you develop an allergic reaction to the medications that were given during your procedure such as difficulty breathing, rash, hives, severe nausea, vomiting or lightheadedness.  - If you experience chest pain, shortness of breath, severe abdominal pain, fevers and chills.  -If you develop signs and symptoms of bleeding such as blood in your spit, if your stools turn black, tarry, or bloody  - If you have not urinated within 8 hours following your procedure.  - If your IV site becomes painful, red, inflamed, or looks infected.    If you received a biopsy/polypectomy/sphincterotomy the following instructions apply below:    __ Do not use Aspirin containing products, non-steroidal medications or anti-coagulants for one week following your procedure. (Examples of these types of medications are: Advil, Arthrotec, Aleve, Coumadin,  Ecotrin, Heparin, Ibuprofen, Indocin, Motrin, Naprosyn, Nuprin, Plavix, Vioxx, and Voltarin, or their generic forms.  This list is not all-inclusive.  Check with your physician or pharmacist before resuming medications.)   __ Eat a soft diet today.  Avoid foods that are poorly digested for the next 24 hours.  These foods would include: nuts, beans, lettuce, red meats, and fried foods. Start with liquids and advance your diet as tolerated, gradually work up to eating solids.   __ Do not have a Barium Study or Enema for one week.    Your physician recommends the additional following instructions:    -You have a contact number available for emergencies. The signs and symptoms of potential delayed complications were discussed with you. You may return to normal activities tomorrow.  -Resume your previous diet.  -Continue your present medications.   -We are waiting for your pathology results.  -Your physician has recommended a repeat colonoscopy (date to be determined after pending pathology results are reviewed) for surveillance based on pathology results.  -The findings and recommendations have been discussed with you.  -The findings and recommendations were discussed with your family.  - Please see Medication Reconciliation Form for new medication/medications prescribed.

## 2024-12-19 NOTE — H&P
Procedure H&P    Patient Profile-Procedures  Name Tootie Bear  Date of Birth 1975  MRN [id number]  Address   [address]  [facility] [id number]  [facility] [address]    Primary Phone Number [phone]  Secondary Phone Number    Evelyn Virk    Procedure(s):  Procedures: Colonoscopy  Primary contact name and number   Extended Emergency Contact Information  Primary Emergency Contact: Zeny Ruff  Home Phone: [phone]  Mobile Phone: [phone]  Relation: Parent  Preferred language: English   needed? No    General Health  Weight   Vitals:    12/19/24 1221   Weight: 58.6 kg (129 lb 3 oz)     BMI Body mass index is 22.18 kg/m².    Allergies  No Known Allergies    Past Medical History   Past Medical History:   Diagnosis Date    Anemia     Anxiety     Asthma     Depression     Diarrhea     Hypertension     Kidney stones     passed on own    Snores     UTI (urinary tract infection)        Provider assessment  Diagnosis: Colon Cancer Screening/Surveillance + chronic diarrhea    Medication Reviewed - yes  Prior to Admission medications    Medication Sig Start Date End Date Taking? Authorizing Provider   amLODIPine (Norvasc) 5 mg tablet Take 1 tablet (5 mg) by mouth once daily.   Yes Historical Provider, MD   ARIPiprazole (Abilify) 10 mg tablet Take 1 tablet (10 mg) by mouth as needed at bedtime. 11/11/24  Yes Historical Provider, MD   busPIRone (Buspar) 10 mg tablet Take 1 tablet (10 mg) by mouth 2 times a day as needed. 10/5/24  Yes Historical Provider, MD   cholecalciferol (Vitamin D-3) 50,000 unit capsule Take 1 capsule (50,000 Units) by mouth 1 (one) time per week. 10/18/24  Yes Historical Provider, MD   hydrOXYzine pamoate (Vistaril) 50 mg capsule Take 1 capsule (50 mg) by mouth if needed. 11/1/24  Yes Historical Provider, MD   losartan (Cozaar) 50 mg tablet Take 1 tablet (50 mg) by mouth early in the morning.. 10/18/24  Yes Historical Provider, MD   prazosin (Minipress) 1 mg  capsule Take 1 capsule (1 mg) by mouth once daily at bedtime. 10/5/24  Yes Historical Provider, MD   sulfamethoxazole-trimethoprim (Bactrim DS) 800-160 mg tablet Take 1 tablet by mouth every 12 hours. 12/6/24  Yes Historical Provider, MD   Vitamin B-12 1,000 mcg tablet Take 1 tablet (1,000 mcg) by mouth early in the morning.. 10/18/24  Yes Historical Provider, MD   pantoprazole (ProtoNix) 40 mg EC tablet Take 1 tablet (40 mg) by mouth early in the morning..  Patient not taking: Reported on 12/19/2024 10/17/24   Historical Provider, MD   polyethylene glycol (Golytely) 236-22.74-6.74 -5.86 gram solution Take 4,000 mL by mouth 1 time for 1 dose. 12/12/24 12/12/24  Domonique Dennison APRN-CNP   ergocalciferol (Vitamin D-2) 1.25 MG (22440 UT) capsule Take 1 capsule (1,250 mcg) by mouth 1 (one) time per week. 6/10/19 12/16/24  Historical Provider, MD   traZODone (Desyrel) 50 mg tablet Take 1 tablet (50 mg) by mouth.  12/16/24  Historical Provider, MD   venlafaxine XR (Effexor-XR) 75 mg 24 hr capsule  10/5/24 12/16/24  Historical Provider, MD       Physical Exam  Vitals:    12/19/24 1221   BP: 176/87   Pulse: 85   Resp: 16   Temp: 36.4 °C (97.5 °F)   SpO2: 99%        General: A&Ox3, NAD.  HEENT: AT/NC.   CV: RRR. No murmur.  Resp: CTA bilaterally. No wheezing, rhonchi or rales.   GI: Soft, NT/ND. BSx4.  Extrem: No edema. Pulses intact.  Neuro: No focal deficits.   Psych: Normal mood and affect.      Procedure Plan - pre-procedural (re)assesment completed by physician:  discharge/transfer patient when discharge criteria met    Jacqueline Rodarte MD  12/19/2024 12:59 PM

## 2024-12-19 NOTE — NURSING NOTE
VSS, no nausea or pain in Phase II Recovery. Patient tolerated PO intake of clear liquids and cookies. Upon review of discharge instructions no further questions remained.

## 2024-12-19 NOTE — Clinical Note
Patient tolerated procedure well. Appears comfortable with no complaints of pain. VS stable. Arousable prior to transport. Patient transported to [facility] via cart.  Report called per Handoff completed.

## 2024-12-30 ENCOUNTER — CLINICAL SUPPORT (OUTPATIENT)
Dept: GASTROENTEROLOGY | Facility: CLINIC | Age: 49
End: 2024-12-30
Payer: COMMERCIAL

## 2024-12-30 ENCOUNTER — LAB (OUTPATIENT)
Dept: LAB | Facility: LAB | Age: 49
End: 2024-12-30
Payer: COMMERCIAL

## 2024-12-30 DIAGNOSIS — K76.0 HEPATIC STEATOSIS: ICD-10-CM

## 2024-12-30 PROCEDURE — 91200 LIVER ELASTOGRAPHY: CPT | Performed by: INTERNAL MEDICINE

## 2024-12-30 NOTE — LETTER
January 1, 2025     TOMER Bass-CNP  [address]  [address]  [facility] [address]    Patient: Tootie Bear   YOB: 1975   Date of Visit: 12/30/2024       Dear Dr. Domonique Dennison, TOMER-CNP:    Thank you for referring Tootie Bear for evaluation with [facility]. Below are my notes for this consultation.  If you have questions, please do not hesitate to call me.     Sincerely,     [facility] [id number] [facility]IBROSCAN      CC: No Recipients  ______________________________________________________________________________________      Results:  E (median liver stiffness measurement):  4.0  kPa  CAP (controlled attenuation parameter):  199  dB/m    Interpretation:  This was a technically adequate study. 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 The CAP score is consistent with 0 - 5% hepatocyte steatosis (steatosis grade 0 of 3 ) .    Mark Graves MD  Hepatology

## 2024-12-31 ENCOUNTER — APPOINTMENT (OUTPATIENT)
Dept: GASTROENTEROLOGY | Facility: CLINIC | Age: 49
End: 2024-12-31
Payer: COMMERCIAL

## 2024-12-31 LAB
LABORATORY COMMENT REPORT: NORMAL
PATH REPORT.FINAL DX SPEC: NORMAL
PATH REPORT.GROSS SPEC: NORMAL
PATH REPORT.RELEVANT HX SPEC: NORMAL
PATH REPORT.TOTAL CANCER: NORMAL

## 2025-01-01 NOTE — PROGRESS NOTES
Results:  E (median liver stiffness measurement):  4.0  kPa  CAP (controlled attenuation parameter):  199  dB/m    Interpretation:  This was a technically adequate study. 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 The CAP score is consistent with 0 - 5% hepatocyte steatosis (steatosis grade 0 of 3 ) .    Mark Graves MD  Hepatology

## 2025-03-20 ENCOUNTER — HOSPITAL ENCOUNTER (EMERGENCY)
Age: 50
Discharge: HOME | End: 2025-03-20
Attending: EMERGENCY MEDICINE
Payer: COMMERCIAL

## 2025-03-20 VITALS
BODY MASS INDEX: 22.36 KG/M2 | RESPIRATION RATE: 17 BRPM | OXYGEN SATURATION: 100 % | WEIGHT: 131 LBS | HEIGHT: 64 IN | SYSTOLIC BLOOD PRESSURE: 133 MMHG | DIASTOLIC BLOOD PRESSURE: 72 MMHG | TEMPERATURE: 98.1 F | HEART RATE: 74 BPM

## 2025-03-20 DIAGNOSIS — T78.40XA ALLERGIC REACTION, INITIAL ENCOUNTER: Primary | ICD-10-CM

## 2025-03-20 DIAGNOSIS — R21 SCALY PATCH RASH: ICD-10-CM

## 2025-03-20 PROCEDURE — 99284 EMERGENCY DEPT VISIT MOD MDM: CPT

## 2025-03-20 PROCEDURE — 96375 TX/PRO/DX INJ NEW DRUG ADDON: CPT

## 2025-03-20 PROCEDURE — 2580000003 HC RX 258: Performed by: EMERGENCY MEDICINE

## 2025-03-20 PROCEDURE — 2500000003 HC RX 250 WO HCPCS: Performed by: EMERGENCY MEDICINE

## 2025-03-20 PROCEDURE — 6360000002 HC RX W HCPCS: Performed by: EMERGENCY MEDICINE

## 2025-03-20 PROCEDURE — 6370000000 HC RX 637 (ALT 250 FOR IP): Performed by: EMERGENCY MEDICINE

## 2025-03-20 PROCEDURE — 96374 THER/PROPH/DIAG INJ IV PUSH: CPT

## 2025-03-20 RX ORDER — CETIRIZINE HYDROCHLORIDE 10 MG/1
10 TABLET ORAL ONCE
Status: COMPLETED | OUTPATIENT
Start: 2025-03-20 | End: 2025-03-20

## 2025-03-20 RX ORDER — PREDNISONE 20 MG/1
40 TABLET ORAL DAILY
Qty: 10 TABLET | Refills: 0 | Status: SHIPPED | OUTPATIENT
Start: 2025-03-20 | End: 2025-03-25

## 2025-03-20 RX ORDER — BENZOCAINE/MENTHOL 6 MG-10 MG
LOZENGE MUCOUS MEMBRANE
Qty: 15 G | Refills: 1 | Status: SHIPPED | OUTPATIENT
Start: 2025-03-20 | End: 2025-03-27

## 2025-03-20 RX ORDER — DIPHENHYDRAMINE HYDROCHLORIDE 50 MG/ML
12.5 INJECTION, SOLUTION INTRAMUSCULAR; INTRAVENOUS ONCE
Status: COMPLETED | OUTPATIENT
Start: 2025-03-20 | End: 2025-03-20

## 2025-03-20 RX ORDER — CETIRIZINE HYDROCHLORIDE 10 MG/1
10 TABLET ORAL DAILY
Qty: 30 TABLET | Refills: 0 | Status: SHIPPED | OUTPATIENT
Start: 2025-03-20

## 2025-03-20 RX ADMIN — METHYLPREDNISOLONE SODIUM SUCCINATE 125 MG: 125 INJECTION INTRAMUSCULAR; INTRAVENOUS at 11:09

## 2025-03-20 RX ADMIN — FAMOTIDINE 20 MG: 10 INJECTION, SOLUTION INTRAVENOUS at 11:12

## 2025-03-20 RX ADMIN — DIPHENHYDRAMINE HYDROCHLORIDE 12.5 MG: 50 INJECTION INTRAMUSCULAR; INTRAVENOUS at 11:06

## 2025-03-20 RX ADMIN — CETIRIZINE HYDROCHLORIDE 10 MG: 10 TABLET, FILM COATED ORAL at 11:06

## 2025-03-20 ASSESSMENT — PAIN SCALES - GENERAL: PAINLEVEL_OUTOF10: 9

## 2025-03-20 ASSESSMENT — LIFESTYLE VARIABLES
HOW OFTEN DO YOU HAVE A DRINK CONTAINING ALCOHOL: NEVER
HOW MANY STANDARD DRINKS CONTAINING ALCOHOL DO YOU HAVE ON A TYPICAL DAY: PATIENT DOES NOT DRINK

## 2025-03-20 ASSESSMENT — PAIN DESCRIPTION - DESCRIPTORS: DESCRIPTORS: BURNING;THROBBING

## 2025-03-20 ASSESSMENT — ENCOUNTER SYMPTOMS
SORE THROAT: 0
VOMITING: 0
NAUSEA: 0
CHEST TIGHTNESS: 0
ABDOMINAL PAIN: 0
EYE PAIN: 0
FACIAL SWELLING: 1
SHORTNESS OF BREATH: 0

## 2025-03-20 ASSESSMENT — PAIN - FUNCTIONAL ASSESSMENT: PAIN_FUNCTIONAL_ASSESSMENT: 0-10

## 2025-03-20 ASSESSMENT — PAIN DESCRIPTION - PAIN TYPE: TYPE: ACUTE PAIN

## 2025-03-20 ASSESSMENT — PAIN DESCRIPTION - LOCATION: LOCATION: FACE

## 2025-03-20 NOTE — ED PROVIDER NOTES
[facility] EMERGENCY DEPARTMENT  EMERGENCY DEPARTMENT ENCOUNTER      Pt Name: Aide Hatfield  MRN: [id number]  Birthdate 1975  Date of evaluation: 3/20/2025  Provider: Brandi Chester DO    CHIEF COMPLAINT       Chief Complaint   Patient presents with    Allergic Reaction     Facial swelling from unknown allergen         HISTORY OF PRESENT ILLNESS   (Location/Symptom, Timing/Onset, Context/Setting, Quality, Duration, Modifying Factors, Severity)  Note limiting factors.   Aide Hatfield is a 49 y.o. female who presents to the emergency department .  Patient came in because she woke up with facial swelling.  She also has a scaly rash on her neck.  No new products medications.  This has never happened before.  Patient used to be on lisinopril but stopped taking it due to dry cough.  No ACE inhibitor's at this time.    HPI    Nursing Notes were reviewed.    REVIEW OF SYSTEMS    (2-9 systems for level 4, 10 or more for level 5)     Review of Systems   Constitutional:  Negative for activity change, appetite change and fatigue.   HENT:  Positive for facial swelling. Negative for congestion and sore throat.    Eyes:  Negative for pain and visual disturbance.   Respiratory:  Negative for chest tightness and shortness of breath.    Cardiovascular:  Negative for chest pain.   Gastrointestinal:  Negative for abdominal pain, nausea and vomiting.   Endocrine: Negative for polydipsia.   Genitourinary:  Negative for flank pain and urgency.   Musculoskeletal:  Negative for gait problem and neck stiffness.   Skin:  Positive for rash.   Neurological:  Negative for weakness, light-headedness and headaches.   Psychiatric/Behavioral:  Negative for confusion and sleep disturbance.        Except as noted above the remainder of the review of systems was reviewed and negative.       PAST MEDICAL HISTORY     Past Medical History:   Diagnosis Date    ADHD (attention deficit hyperactivity disorder) 1980    Allergic rhinitis     seasonal  Monitoring:          No data to display                (Please note that portions of this note were completed with a voice recognition program.  Efforts were made to edit the dictations but occasionally words are mis-transcribed.)    Brandi Chester DO (electronically signed)  Attending Emergency Physician            Brandi Chester DO  03/20/25 1154

## 2025-03-20 NOTE — ED NOTES
Pt woke up this morning with swelling in eyes. Pt states her face and throat feel tight. She denies trouble breathing/swallowing.